# Patient Record
Sex: FEMALE | Race: WHITE | Employment: OTHER | URBAN - METROPOLITAN AREA
[De-identification: names, ages, dates, MRNs, and addresses within clinical notes are randomized per-mention and may not be internally consistent; named-entity substitution may affect disease eponyms.]

---

## 2017-04-08 LAB
CHOLESTEROL, TOTAL: 192 MG/DL (ref 0–199)
HDLC SERPL-MCNC: 55 MG/DL (ref 40–60)
HEPATITIS C ANTIBODY INTERPRETATION: NORMAL
LDL CHOLESTEROL CALCULATED: 116 MG/DL
TRIGL SERPL-MCNC: 105 MG/DL (ref 0–150)
VLDLC SERPL CALC-MCNC: 21 MG/DL

## 2017-04-17 ENCOUNTER — OFFICE VISIT (OUTPATIENT)
Dept: PRIMARY CARE CLINIC | Age: 72
End: 2017-04-17

## 2017-04-17 VITALS
HEIGHT: 60 IN | WEIGHT: 211 LBS | BODY MASS INDEX: 41.43 KG/M2 | SYSTOLIC BLOOD PRESSURE: 138 MMHG | DIASTOLIC BLOOD PRESSURE: 82 MMHG | HEART RATE: 100 BPM

## 2017-04-17 DIAGNOSIS — I10 BENIGN ESSENTIAL HTN: Primary | ICD-10-CM

## 2017-04-17 PROCEDURE — 99214 OFFICE O/P EST MOD 30 MIN: CPT | Performed by: INTERNAL MEDICINE

## 2017-04-17 RX ORDER — PRAVASTATIN SODIUM 40 MG
40 TABLET ORAL DAILY
Qty: 90 TABLET | Refills: 3 | Status: SHIPPED | OUTPATIENT
Start: 2017-04-17 | End: 2018-07-08 | Stop reason: SDUPTHER

## 2017-04-17 RX ORDER — HYDROCHLOROTHIAZIDE 12.5 MG/1
12.5 CAPSULE, GELATIN COATED ORAL EVERY MORNING
Qty: 90 CAPSULE | Refills: 2 | Status: SHIPPED | OUTPATIENT
Start: 2017-04-17 | End: 2017-12-26

## 2017-04-17 ASSESSMENT — PATIENT HEALTH QUESTIONNAIRE - PHQ9
1. LITTLE INTEREST OR PLEASURE IN DOING THINGS: 0
SUM OF ALL RESPONSES TO PHQ9 QUESTIONS 1 & 2: 0
SUM OF ALL RESPONSES TO PHQ QUESTIONS 1-9: 0
2. FEELING DOWN, DEPRESSED OR HOPELESS: 0

## 2017-07-21 ENCOUNTER — TELEPHONE (OUTPATIENT)
Dept: PRIMARY CARE CLINIC | Age: 72
End: 2017-07-21

## 2017-12-11 ENCOUNTER — OFFICE VISIT (OUTPATIENT)
Dept: PRIMARY CARE CLINIC | Age: 72
End: 2017-12-11

## 2017-12-11 VITALS
HEART RATE: 80 BPM | SYSTOLIC BLOOD PRESSURE: 160 MMHG | WEIGHT: 217.6 LBS | DIASTOLIC BLOOD PRESSURE: 90 MMHG | BODY MASS INDEX: 42.85 KG/M2

## 2017-12-11 DIAGNOSIS — R00.2 HEART PALPITATIONS: ICD-10-CM

## 2017-12-11 DIAGNOSIS — I10 BENIGN ESSENTIAL HTN: Primary | ICD-10-CM

## 2017-12-11 DIAGNOSIS — E78.00 HYPERCHOLESTEROLEMIA: ICD-10-CM

## 2017-12-11 LAB
BASOPHILS ABSOLUTE: 0 K/UL (ref 0–0.2)
BASOPHILS RELATIVE PERCENT: 0.5 %
CREATININE URINE: 189.4 MG/DL (ref 28–259)
EOSINOPHILS ABSOLUTE: 0.1 K/UL (ref 0–0.6)
EOSINOPHILS RELATIVE PERCENT: 1 %
HCT VFR BLD CALC: 46.1 % (ref 36–48)
HEMOGLOBIN: 15.6 G/DL (ref 12–16)
LYMPHOCYTES ABSOLUTE: 2.6 K/UL (ref 1–5.1)
LYMPHOCYTES RELATIVE PERCENT: 28.2 %
MCH RBC QN AUTO: 30.2 PG (ref 26–34)
MCHC RBC AUTO-ENTMCNC: 33.7 G/DL (ref 31–36)
MCV RBC AUTO: 89.6 FL (ref 80–100)
MICROALBUMIN UR-MCNC: 3 MG/DL
MICROALBUMIN/CREAT UR-RTO: 15.8 MG/G (ref 0–30)
MONOCYTES ABSOLUTE: 0.7 K/UL (ref 0–1.3)
MONOCYTES RELATIVE PERCENT: 7.7 %
NEUTROPHILS ABSOLUTE: 5.8 K/UL (ref 1.7–7.7)
NEUTROPHILS RELATIVE PERCENT: 62.6 %
PDW BLD-RTO: 13.4 % (ref 12.4–15.4)
PLATELET # BLD: 329 K/UL (ref 135–450)
PMV BLD AUTO: 7.8 FL (ref 5–10.5)
RBC # BLD: 5.15 M/UL (ref 4–5.2)
WBC # BLD: 9.2 K/UL (ref 4–11)

## 2017-12-11 PROCEDURE — 93000 ELECTROCARDIOGRAM COMPLETE: CPT | Performed by: INTERNAL MEDICINE

## 2017-12-11 PROCEDURE — 36415 COLL VENOUS BLD VENIPUNCTURE: CPT | Performed by: INTERNAL MEDICINE

## 2017-12-11 PROCEDURE — 99214 OFFICE O/P EST MOD 30 MIN: CPT | Performed by: INTERNAL MEDICINE

## 2017-12-11 RX ORDER — METOPROLOL SUCCINATE 50 MG/1
50 TABLET, EXTENDED RELEASE ORAL DAILY
Qty: 30 TABLET | Refills: 3 | Status: SHIPPED | OUTPATIENT
Start: 2017-12-11 | End: 2018-05-07 | Stop reason: SDUPTHER

## 2017-12-11 NOTE — PROGRESS NOTES
Ryan Jansen is a 81 y.o. female presenting today with c/o    HYPERTENSION. Also here for f/u HTN. She indicates that she is feeling well and denies any symptoms referable to her elevated blood pressure. Specifically denies chest pain, palpitations, dyspnea, orthopnea, PND or peripheral edema. No anorexia, arthralgia, or leg cramps noted. Current medication regimen is as listed below. She denies any side effects of medication, and has been taking it regularly. Patient does not exercise regularly        Dyslipidemia: Patient presents for f/u of lipid disorder. Compliance with treatment thus far has been good. A repeat fasting lipid profile was not done. The patient does not have family history of premature CAD. The patient denies side effects from medication including nausea and myalgias. The patient exercises never. Patient is overweight.   Review of Systems - comprehensive review of systems negative except as noted in HPI    Allergies   Allergen Reactions    Bactrim Diarrhea    Prochlorperazine Edisylate Other (See Comments)     Dystonic reaction    Prochlorperazine Maleate     Sulfamethoxazole-Trimethoprim     Amoxicillin      Jittery, causes her to feel sedated or jittery       Past Medical History:   Diagnosis Date    Adenomatous colon polyp 2/2011-[place] 12/15/2014    BMI 39.0-39.9,adult     BMI 40.0-44.9, adult (HCC)     Elevated blood pressure     Eustachian tube dysfunction 3/21/2014    HTN (hypertension) 2/14/2014    Hypercholesterolemia     Low bone mass 2/28/2014    Osteoarthritis, hand 12/15/2014    S/P tympanostomy tube placement 3/21/2014    Stress fracture of ankle 6/22/2016    Wheezing        Past Surgical History:   Procedure Laterality Date    APPENDECTOMY      BLADDER SURGERY      polyps    HYSTERECTOMY, TOTAL ABDOMINAL         Family History   Problem Relation Age of Onset    Alzheimer's Disease Mother     Asthma Brother     Hypertension Sister        Social History Social History    Marital status:      Spouse name: N/A    Number of children: N/A    Years of education: N/A     Occupational History    [employer]      Social History Main Topics    Smoking status: Never Smoker    Smokeless tobacco: Never Used    Alcohol use 0.0 oz/week      Comment: occasionally    Drug use: No    Sexual activity: No      Comment: ; 0 children     Other Topics Concern    Not on file     Social History Narrative    No regular exercise         Current Outpatient Prescriptions on File Prior to Visit   Medication Sig Dispense Refill    hydrochlorothiazide (MICROZIDE) 12.5 MG capsule Take 1 capsule by mouth every morning 90 capsule 2    pravastatin (PRAVACHOL) 40 MG tablet Take 1 tablet by mouth daily 90 tablet 3    ibuprofen (ADVIL;MOTRIN) 800 MG tablet Take 1 tablet by mouth every 8 hours as needed for Pain 30 tablet 0    diclofenac sodium (VOLTAREN) 1 % GEL Apply 4 g topically 4 times daily as needed Apply 4 gram 4 times a day 500 g 2    PAZEO 0.7 % SOLN       fluticasone (FLONASE) 50 MCG/ACT nasal spray 2 sprays each nostril daily 1 Bottle 2    Cholecalciferol (VITAMIN D) 2000 UNITS CAPS capsule Take 1 capsule by mouth daily. No current facility-administered medications on file prior to visit.         Vitals:    12/11/17 1458   BP: (!) 146/88   Pulse: 80         Wt Readings from Last 3 Encounters:   12/11/17 217 lb 9.6 oz (98.7 kg)   04/17/17 211 lb (95.7 kg)   10/17/16 217 lb (98.4 kg)     BP Readings from Last 3 Encounters:   12/11/17 (!) 146/88   04/17/17 138/82   10/17/16 144/88         BP (!) 160/90   Pulse 80   Wt 217 lb 9.6 oz (98.7 kg)   BMI 42.85 kg/m²   General appearance: alert, appears stated age, cooperative and morbidly obese  Eyes: negative findings: lids and lashes normal and conjunctivae and sclerae normal  Throat: lips, mucosa, and tongue normal; teeth and gums normal  Lungs: clear to auscultation bilaterally  Heart: regular rate , but frequent palpitations auscultated . S1, S2 normal, no murmur, click, rub or gallop  Neurologic: Mental status: Alert, oriented, thought content appropriate  Cranial nerves: normal  Skin: Skin is warm and dry. [facility]: normal mood and affect. speech is normal and behavior is normal. Judgment, cognition and memory are normal.     not applicable    Assessment and Plan  1. Benign essential HTN  Not well controlled  Add metoprolol-will help tachycardia  Does not want to increase her hctz . Felt she had SE before  - Lipid Panel  - Hemoglobin A1C  - Comprehensive Metabolic Panel  - Microalbumin / Creatinine Urine Ratio      2. BMI 40.0-44.9, adult ([facility])  Recommended at least 2.5 hours weekly of moderate intensity exercise  Low cho, high pro, high fiber, frequent small meals    3.  Heart palpitations  Reassuring ecg in office  Add toprol  - EKG 12 Lead  - CBC Auto Differential  - MAGNESIUM  - CBC Auto Differential  - MAGNESIUM    4. Hypercholesterolemia  Discuss increasing statin next visit for moderate/high intensity

## 2017-12-12 ENCOUNTER — TELEPHONE (OUTPATIENT)
Dept: PRIMARY CARE CLINIC | Age: 72
End: 2017-12-12

## 2017-12-12 LAB
A/G RATIO: 1.5 (ref 1.1–2.2)
ALBUMIN SERPL-MCNC: 4.5 G/DL (ref 3.4–5)
ALP BLD-CCNC: 115 U/L (ref 40–129)
ALT SERPL-CCNC: 17 U/L (ref 10–40)
ANION GAP SERPL CALCULATED.3IONS-SCNC: 20 MMOL/L (ref 3–16)
AST SERPL-CCNC: 19 U/L (ref 15–37)
BILIRUB SERPL-MCNC: 0.3 MG/DL (ref 0–1)
BUN BLDV-MCNC: 17 MG/DL (ref 7–20)
CALCIUM SERPL-MCNC: 10 MG/DL (ref 8.3–10.6)
CHLORIDE BLD-SCNC: 99 MMOL/L (ref 99–110)
CHOLESTEROL, TOTAL: 212 MG/DL (ref 0–199)
CO2: 25 MMOL/L (ref 21–32)
CREAT SERPL-MCNC: 1 MG/DL (ref 0.6–1.2)
ESTIMATED AVERAGE GLUCOSE: 114 MG/DL
GFR AFRICAN AMERICAN: >60
GFR NON-AFRICAN AMERICAN: 55
GLOBULIN: 3.1 G/DL
GLUCOSE BLD-MCNC: 97 MG/DL (ref 70–99)
HBA1C MFR BLD: 5.6 %
HDLC SERPL-MCNC: 62 MG/DL (ref 40–60)
LDL CHOLESTEROL CALCULATED: 122 MG/DL
MAGNESIUM: 2.5 MG/DL (ref 1.8–2.4)
POTASSIUM SERPL-SCNC: 4.5 MMOL/L (ref 3.5–5.1)
SODIUM BLD-SCNC: 144 MMOL/L (ref 136–145)
TOTAL PROTEIN: 7.6 G/DL (ref 6.4–8.2)
TRIGL SERPL-MCNC: 138 MG/DL (ref 0–150)
VLDLC SERPL CALC-MCNC: 28 MG/DL

## 2017-12-26 ENCOUNTER — OFFICE VISIT (OUTPATIENT)
Dept: PRIMARY CARE CLINIC | Age: 72
End: 2017-12-26

## 2017-12-26 VITALS
HEART RATE: 136 BPM | DIASTOLIC BLOOD PRESSURE: 100 MMHG | SYSTOLIC BLOOD PRESSURE: 144 MMHG | OXYGEN SATURATION: 98 % | BODY MASS INDEX: 42.34 KG/M2 | WEIGHT: 215 LBS

## 2017-12-26 DIAGNOSIS — I10 BENIGN ESSENTIAL HTN: Primary | ICD-10-CM

## 2017-12-26 PROCEDURE — 99214 OFFICE O/P EST MOD 30 MIN: CPT | Performed by: INTERNAL MEDICINE

## 2017-12-26 RX ORDER — HYDROCHLOROTHIAZIDE 25 MG/1
TABLET ORAL
Qty: 30 TABLET | Refills: 3
Start: 2017-12-26 | End: 2018-03-19 | Stop reason: SDUPTHER

## 2017-12-26 NOTE — PROGRESS NOTES
Caden Greene is a 81 y.o. female presenting today with c/o    HYPERTENSION. Also here for f/u HTN. She indicates that she is feeling well and denies any symptoms referable to her elevated blood pressure. Specifically denies chest pain, palpitations, dyspnea, orthopnea, PND or peripheral edema. No anorexia, arthralgia, or leg cramps noted. Current medication regimen is as listed below. She denies any side effects of medication, and has been taking it regularly.  Patient does not exercise regularly  Stopped toprol XL due to fatigue 4 days ago  bp readings at home 120-140/70-90 while taking the toprol  Felt hctz makes her urinate too much, but increased to 25 mg            Review of Systems - comprehensive review of systems negative except as noted in HPI    Allergies   Allergen Reactions    Bactrim Diarrhea    Prochlorperazine Edisylate Other (See Comments)     Dystonic reaction    Prochlorperazine Maleate     Sulfamethoxazole-Trimethoprim     Amoxicillin      Jittery, causes her to feel sedated or jittery       Past Medical History:   Diagnosis Date    BMI 39.0-39.9,adult     BMI 40.0-44.9, adult (HCC)     Elevated blood pressure     Eustachian tube dysfunction 3/21/2014    HTN (hypertension) 2/14/2014    Hypercholesterolemia     Low bone mass 2/28/2014    Osteoarthritis, hand 12/15/2014    S/P tympanostomy tube placement 3/21/2014    Stress fracture of ankle 6/22/2016    Wheezing        Past Surgical History:   Procedure Laterality Date    APPENDECTOMY      BLADDER SURGERY      polyps    HYSTERECTOMY, TOTAL ABDOMINAL         Family History   Problem Relation Age of Onset    Alzheimer's Disease Mother     Asthma Brother     Hypertension Sister        Social History     Social History    Marital status:      Spouse name: N/A    Number of children: N/A    Years of education: N/A     Occupational History    [employer]      Social History Main Topics    Smoking status: Never Smoker    Smokeless tobacco: Never Used    Alcohol use 0.0 oz/week      Comment: occasionally    Drug use: No    Sexual activity: No      Comment: ; 0 children     Other Topics Concern    Not on file     Social History Narrative    No regular exercise         Current Outpatient Prescriptions on File Prior to Visit   Medication Sig Dispense Refill    metoprolol succinate (TOPROL XL) 50 MG extended release tablet Take 1 tablet by mouth daily For high blood pressure 30 tablet 3    hydrochlorothiazide (MICROZIDE) 12.5 MG capsule Take 1 capsule by mouth every morning 90 capsule 2    pravastatin (PRAVACHOL) 40 MG tablet Take 1 tablet by mouth daily 90 tablet 3    ibuprofen (ADVIL;MOTRIN) 800 MG tablet Take 1 tablet by mouth every 8 hours as needed for Pain 30 tablet 0    diclofenac sodium (VOLTAREN) 1 % GEL Apply 4 g topically 4 times daily as needed Apply 4 gram 4 times a day 500 g 2    PAZEO 0.7 % SOLN       fluticasone (FLONASE) 50 MCG/ACT nasal spray 2 sprays each nostril daily 1 Bottle 2    Cholecalciferol (VITAMIN D) 2000 UNITS CAPS capsule Take 1 capsule by mouth daily. No current facility-administered medications on file prior to visit.         Vitals:    12/26/17 1601   BP: (!) 144/100   Pulse: 136   SpO2: 98%         Wt Readings from Last 3 Encounters:   12/26/17 215 lb (97.5 kg)   12/11/17 217 lb 9.6 oz (98.7 kg)   04/17/17 211 lb (95.7 kg)     BP Readings from Last 3 Encounters:   12/26/17 (!) 144/100   12/11/17 (!) 160/90   04/17/17 138/82         BP (!) 144/100 (Site: Right Arm, Position: Sitting, Cuff Size: Large Adult)   Pulse 136   Wt 215 lb (97.5 kg)   SpO2 98%   BMI 42.34 kg/m²   General appearance: alert and appears stated age  Eyes: negative findings: lids and lashes normal and conjunctivae and sclerae normal  Lungs: clear to auscultation bilaterally  Heart: regular rate and rhythm, S1, S2 normal, no murmur, click, rub or gallop  Neurologic: Mental status: Alert,

## 2018-01-09 ENCOUNTER — OFFICE VISIT (OUTPATIENT)
Dept: PRIMARY CARE CLINIC | Age: 73
End: 2018-01-09

## 2018-01-09 VITALS
DIASTOLIC BLOOD PRESSURE: 80 MMHG | WEIGHT: 217 LBS | SYSTOLIC BLOOD PRESSURE: 138 MMHG | BODY MASS INDEX: 42.74 KG/M2 | HEART RATE: 60 BPM

## 2018-01-09 DIAGNOSIS — M94.0 COSTOCHONDRITIS: ICD-10-CM

## 2018-01-09 DIAGNOSIS — I10 BENIGN ESSENTIAL HTN: Primary | ICD-10-CM

## 2018-01-09 PROCEDURE — 99214 OFFICE O/P EST MOD 30 MIN: CPT | Performed by: INTERNAL MEDICINE

## 2018-02-19 ENCOUNTER — OFFICE VISIT (OUTPATIENT)
Dept: ORTHOPEDIC SURGERY | Age: 73
End: 2018-02-19

## 2018-02-19 VITALS
HEIGHT: 61 IN | WEIGHT: 215 LBS | HEART RATE: 92 BPM | SYSTOLIC BLOOD PRESSURE: 132 MMHG | DIASTOLIC BLOOD PRESSURE: 76 MMHG | BODY MASS INDEX: 40.59 KG/M2

## 2018-02-19 DIAGNOSIS — M25.552 LEFT HIP PAIN: ICD-10-CM

## 2018-02-19 DIAGNOSIS — S46.312A TRICEPS STRAIN, LEFT, INITIAL ENCOUNTER: ICD-10-CM

## 2018-02-19 DIAGNOSIS — M25.512 LEFT SHOULDER PAIN, UNSPECIFIED CHRONICITY: Primary | ICD-10-CM

## 2018-02-19 DIAGNOSIS — M70.62 TROCHANTERIC BURSITIS OF LEFT HIP: ICD-10-CM

## 2018-02-19 PROCEDURE — 99214 OFFICE O/P EST MOD 30 MIN: CPT | Performed by: ORTHOPAEDIC SURGERY

## 2018-02-19 NOTE — PROGRESS NOTES
Review of Systems   Cardiovascular:        HBP     Musculoskeletal: Positive for joint pain. Skin:        roscea     All other systems reviewed and are negative.

## 2018-02-19 NOTE — PROGRESS NOTES
Date:  2018    Name:  Cedric Chung  Address:  [address] [place] [address]    :  1945      Age:   81 y.o.    SSN:  [id number]      Medical Record Number:  [id number]    Reason for Visit:    Chief Complaint    Shoulder Pain (OPNP, left shoulder pain) and Hip Pain (left hip pain)      DOS:2018     HPI: Tyson Ingram is a 81 y.o. female here today for evaluation of her left shoulder and her left hip. Her shoulder pain began 2 years ago when she reached laterally to  her CAT and felt a sharp pain in posterior aspect of her shoulder. Better on its own. She kept reinjuring it through repeated movements. Most recently happened about 2 months ago and the pain has been persistent. Worse with activity. It gets worse with cold temperatures. Heat makes it feel better. It does not bother her at night. It doesn't bother her first thing in the morning. She can perform all of her activities of daily living. There is no radiating numbness or tingling. She did not remember noticing any bruising. She think she feels some possible fibrofatty nodules in the area. She has the multiple other places and have had some removed in the past.    She's been having left hip pain that is laterally based. She's also had this in the past.  It hurts her when she is trying to get in and out of her car. It hurts her with walking for long distances or sitting for any extended period time. It doesn't radiate. There is no pain in her groin. Pain Assessment  Location of Pain: Shoulder  Location Modifiers: Left  Severity of Pain: 3  Quality of Pain: Dull (sore, stinging)  Duration of Pain:  (months)  Aggravating Factors:  (cold air)  Relieving Factors: Heat  Result of Injury: No  Work-Related Injury: No  Are there other pain locations you wish to document?: No  ROS: Pertinent items are noted in HPI.         Past Medical History:   Diagnosis Date    BMI 39.0-39.9,adult     BMI 40.0-44.9, visit. Allergies   Allergen Reactions    Bactrim Diarrhea    Prochlorperazine Edisylate Other (See Comments)     Dystonic reaction    Prochlorperazine Maleate     Sulfamethoxazole-Trimethoprim     Amoxicillin      Jittery, causes her to feel sedated or jittery       Vital signs:  /76   Pulse 92   Ht 5' 1\" (1.549 m)   Wt 215 lb (97.5 kg)   BMI 40.62 kg/m²        Neuro: Alert & oriented x 3,  normal,  no focal deficits noted. Normal affect. Eyes: sclera clear  Ears: Normal external ear  Mouth:  No perioral lesions  Pulm: Respirations unlabored and regular  Pulse: Regular rate and rhythm   Skin: Warm, well perfused        Left shoulder exam    Inspection:  No gross deformities, periscapular musculature is symmetry without atrophy, no obvious winging. No trophic changes. No visible masses. Palpation: Focal tenderness over the proximal lateral aspect of the triceps and posterior deltoid region. No significant tenderness overlying the rotator cuff footprint, bicipital groove, AC joint, or periscapular region. No palpable masses. Active/Passive ROM: full in forward flexion, abduction, external rotation with the elbow at the side, and internal rotation    Strength: 5/5 strength testing of deltoid, supraspinatus, infraspinatus, teres minor, and subscapularis    Stability: negative sulcus sign, no evidence of instability    Neurovascular: Neurovascularly intact    Special Tests: Negative impingement test.  Negative Bastrop's and speed's.         Right comparison shoulder exam    Inspection:  No gross deformities, periscapular musculature is symmetry without atrophy, no obvious winging    Palpation: No significant tenderness overlying the rotator cuff footprint, bicipital groove, AC joint, or periscapular region    Active/Passive ROM: full in forward flexion, abduction, external rotation with the elbow at the side, and internal rotation    Strength: 5/5 strength testing of deltoid, supraspinatus,

## 2018-02-22 ENCOUNTER — EVALUATION (OUTPATIENT)
Dept: PHYSICAL THERAPY | Age: 73
End: 2018-02-22

## 2018-02-22 DIAGNOSIS — M25.552 LEFT HIP PAIN: Primary | ICD-10-CM

## 2018-02-22 PROCEDURE — 97161 PT EVAL LOW COMPLEX 20 MIN: CPT | Performed by: PHYSICAL THERAPIST

## 2018-02-22 PROCEDURE — 97110 THERAPEUTIC EXERCISES: CPT | Performed by: PHYSICAL THERAPIST

## 2018-02-22 NOTE — PLAN OF CARE
[facility]       Physical Therapy Certification    Dear Referring Practitioner: Marge Herrera MD,    We had the pleasure of evaluating the following patient for physical therapy services at [address] [place] [address]. A summary of our findings can be found in the initial assessment below. This includes our plan of care. If you have any questions or concerns regarding these findings, please do not hesitate to contact me at the office phone number checked above. Thank you for the referral.       Physician Signature:_______________________________Date:__________________  By signing above (or electronic signature), therapists plan is approved by physician    Patient: Griselda Aviles   : 1945   MRN: [id number]  Referring Physician: Referring Practitioner: aMrge Herrera MD      Evaluation Date: 2018      Medical Diagnosis Information:  Diagnosis: L trochanteric bursitis M70.62   Treatment Diagnosis: L hip pain M25.552                                         Insurance information: PT Insurance Information: [place]     Precautions/ Contra-indications:   Latex Allergy:  [x]NO      []YES  Preferred Language for Healthcare:   [x]English       []other:    SUBJECTIVE: Patient stated complaint:Pt reports chronic L hip pain that has worsened over past couple months. Most pain reported with sleeping on L side, walking, and getting in/out of car. She has not used any ice/heat or taken any medication. She reports some L lat knee pain as well. Pt also reports \"locking\" outside of L hip after sitting. Relevant Medical History:none reported  Functional Disability Index:PT G-Codes  Functional Assessment Tool Used: LEFS  Score: 15%  Functional Limitation: Mobility: Walking and moving around  Mobility: Walking and Moving Around Current Status ():  At least 1 percent but less than 20 percent impaired, limited or restricted  Mobility: Walking and Moving Around Goal Status (): At least 1 percent but less than 20 percent impaired, limited or restricted    Pain Scale: 3/10  Easing factors: rest  Provocative factors: walking, twisting, squatting     Type: []Constant   [x]Intermittent  []Radiating []Localized []other:     Numbness/Tingling:none reported    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[place]; seated desk job    Living Status/Prior Level of Function: Independent with ADLs and IADLs, lives alone; no stairs    OBJECTIVE:     ROM LEFT RIGHT   HIP Flex WFL WFL   HIP Abd     HIP Ext     HIP IR Decreased 50% to R    HIP ER     Knee ext     Knee Flex     Ankle PF     Ankle DF     Ankle In     Ankle Ev     Strength  LEFT RIGHT   HIP Flexors 4+/5 5/5   HIP Abductors 4+/5 4+/5   HIP Ext     Hip ER 4+/5 4+/5   Hip IR 4+/5 4+/5   Knee EXT (quad) 5/5 5/5   Knee Flex (HS) 4/5 5/5   Ankle DF     Ankle PF     Ankle Inv     Ankle EV          Circumference  Mid apex  7 cm prox             Reflexes/Sensation: not tested   []Dermatomes/Myotomes intact    [x]Reflexes equal and normal bilaterally   []Other:    Joint mobility:    [x]Normal    []Hypo   []Hyper    Palpation: TTP L greater trochanter and ITB    Functional Mobility/Transfers: independent    Posture: knee valgus noted B    Bandages/Dressings/Incisions: n/a    Gait: (include devices/WB status) wide based gait with mild L antalgic gait pattern    Orthopedic Special Tests: (+) Obers L                       [x] Patient history, allergies, meds reviewed. Medical chart reviewed. See intake form. Review Of Systems (ROS):  [x]Performed Review of systems (Integumentary, CardioPulmonary, Neurological) by intake and observation. Intake form has been scanned into medical record. Patient has been instructed to contact their primary care physician regarding ROS issues if not already being addressed at this time.       Co-morbidities/Complexities (which will affect course of rehabilitation):   []None           Arthritic conditions hypomobility   [x]Decreased LE functional ROM   [x]Decreased core/proximal hip strength and neuromuscular control   [x]Decreased LE functional strength   [x]Reduced balance/proprioceptive control   []other:      Functional Activity Limitations (from functional questionnaire and intake)   [x]Reduced ability to tolerate prolonged functional positions   []Reduced ability or difficulty with changes of positions or transfers between positions   []Reduced ability to maintain good posture and demonstrate good body mechanics with sitting, bending, and lifting   [x]Reduced ability to sleep   [x] Reduced ability or tolerance with driving and/or computer work   []Reduced ability to perform lifting, carrying tasks   [x]Reduced ability to squat   []Reduced ability to forward bend   [x]Reduced ability to ambulate prolonged functional periods/distances/surfaces   [x]Reduced ability to ascend/descend stairs   [x]Reduced ability to run, hop, cut or jump   []other:    Participation Restrictions   []Reduced participation in self care activities   [x]Reduced participation in home management activities   []Reduced participation in work activities   [x]Reduced participation in social activities. [x]Reduced participation in sport/recreation activities. Classification :    []Signs/symptoms consistent with post-surgical status including decreased ROM, strength and function.    []Signs/symptoms consistent with joint sprain/strain   []Signs/symptoms consistent with patella-femoral syndrome   []Signs/symptoms consistent with knee OA/hip OA   []Signs/symptoms consistent with internal derangement of knee/Hip   [x]Signs/symptoms consistent with functional hip weakness/NMR control      []Signs/symptoms consistent with tendinitis/tendinosis    []signs/symptoms consistent with pathology which may benefit from Dry needling      [x]other: sxs consistent with greater trochanteric bursitis and ITB syndrome     Prognosis/Rehab Potential:

## 2018-02-22 NOTE — FLOWSHEET NOTE
No    PLAN: See eval  [] Continue per plan of care [] Alter current plan (see comments)  [x] Plan of care initiated [] Hold pending MD visit [] Discharge    Electronically signed by: Troy Villaseñor, DPT [id number]

## 2018-02-27 ENCOUNTER — TREATMENT (OUTPATIENT)
Dept: PHYSICAL THERAPY | Age: 73
End: 2018-02-27

## 2018-02-27 DIAGNOSIS — M25.512 CHRONIC LEFT SHOULDER PAIN: Primary | ICD-10-CM

## 2018-02-27 DIAGNOSIS — G89.29 CHRONIC LEFT SHOULDER PAIN: Primary | ICD-10-CM

## 2018-02-27 PROCEDURE — 97110 THERAPEUTIC EXERCISES: CPT | Performed by: PHYSICAL THERAPIST

## 2018-02-27 PROCEDURE — 97161 PT EVAL LOW COMPLEX 20 MIN: CPT | Performed by: PHYSICAL THERAPIST

## 2018-02-27 PROCEDURE — 97140 MANUAL THERAPY 1/> REGIONS: CPT | Performed by: PHYSICAL THERAPIST

## 2018-02-27 NOTE — PLAN OF CARE
Index: PT G-Codes  Functional Assessment Tool Used: QuickDASH  Score: 20%  Functional Limitation: Carrying, moving and handling objects  Carrying, Moving and Handling Objects Current Status (): At least 20 percent but less than 40 percent impaired, limited or restricted  Carrying, Moving and Handling Objects Goal Status (): At least 1 percent but less than 20 percent impaired, limited or restricted    Pain Scale: 3/10  Easing factors: rest, heat  Provocative factors: cold, sleeping, opening car door, lifting     Type: []Constant   [x]Intermittent  [x]Radiating []Localized []other:     Numbness/Tingling: occasionally L upper arm    Occupation/School: office job at [employer] Level of Function: Independent with ADLs and IADLs, lives alone, recently moved to new apartment and new job; would like to return to walking on a regular basis    OBJECTIVE:     CERV ROM All WFL    Cervical Flexion     Cervical Extension     Cervical SB     Cervical rotation          AROM Left Right   Shoulder Flex 160 deg    Shoulder Abd 162 deg    Shoulder ER T2 T4   Shoulder IR T10 T8                  Strength  Left Right   Shoulder Flex 5/5    Shoulder Scap 4+/5    Shoulder ER 4+/5    Shoulder IR 4+/5    Elbow flex 5/5    Elbow ext 5/5      Reflexes/Sensation:    [x]Dermatomes/Myotomes intact    []Reflexes equal and normal bilaterally   []Other:    Joint mobility:    [x]Normal    []Hypo   []Hyper    Palpation: TTP post capsule, lats, triceps    Functional Mobility/Transfers: independent    Posture: mild rounded shoulders, protracted scapulae    Bandages/Dressings/Incisions: n/a    Gait: (include devices/WB status): WNL    Orthopedic Special Tests: Cervical: (-) spurlings, (-) distraction  L shoulder: (-) Neer, (-) Empty Can                       [x] Patient history, allergies, meds reviewed. Medical chart reviewed. See intake form.      Review Of Systems (ROS):  [x]Performed Review of

## 2018-02-27 NOTE — FLOWSHEET NOTE
[facility],  [address]  Phone: [phone]  Fax [phone]      Physical Therapy Daily Treatment Note  Date:  2018    Patient Name:  Jarocho León    :  1945  MRN: [id number]  Restrictions/Precautions:    Medical/Treatment Diagnosis Information:  · Diagnosis: S46.312D (ICD-10-CM) - Triceps strain, left  · Treatment Diagnosis: L shoulder pain L16.390  Insurance/Certification information:  PT Insurance Information: [employer]  Physician Information:  Referring Practitioner: Vicki Pereira MD  Plan of care signed (Y/N):     Date of Patient follow up with Physician:     G-Code (if applicable):      Date G-Code Applied:    PT G-Codes  Functional Assessment Tool Used: QuickDASH  Score: 20%  Functional Limitation: Carrying, moving and handling objects  Carrying, Moving and Handling Objects Current Status (): At least 20 percent but less than 40 percent impaired, limited or restricted  Carrying, Moving and Handling Objects Goal Status ():  At least 1 percent but less than 20 percent impaired, limited or restricted    Progress Note: [x]  Yes  []  No  Next due by: Visit #10      Latex Allergy:  [x]NO      []YES  Preferred Language for Healthcare:   [x]English       []other:    Visit # Insurance Allowable Requires auth   1 75    [x]no        []yes:     Pain level:  3/10     SUBJECTIVE:  See eval    OBJECTIVE: See eval  Observation:   Test measurements:      RESTRICTIONS/PRECAUTIONS: pt also being treated for L hip pain    Exercises/Interventions:   Therapeutic Ex Sets/rep comments   Seated scap squeeze x15    Cross body post capsule S 5x20\"    Supine cane flexion S 10x10\"    sidelying ER 2x10         TB Row Green 2x10    TB Ext Green 2x10                                                      Pt ed: POC; HEP; heat; activity modification; postural ed 5'         Manual Intervention     Inf and post GHJ mobs gr III;

## 2018-03-01 ENCOUNTER — TREATMENT (OUTPATIENT)
Dept: PHYSICAL THERAPY | Age: 73
End: 2018-03-01

## 2018-03-01 DIAGNOSIS — M25.552 LEFT HIP PAIN: Primary | ICD-10-CM

## 2018-03-01 PROCEDURE — 97140 MANUAL THERAPY 1/> REGIONS: CPT | Performed by: PHYSICAL THERAPIST

## 2018-03-01 PROCEDURE — 97110 THERAPEUTIC EXERCISES: CPT | Performed by: PHYSICAL THERAPIST

## 2018-03-01 PROCEDURE — 97112 NEUROMUSCULAR REEDUCATION: CPT | Performed by: PHYSICAL THERAPIST

## 2018-03-01 NOTE — FLOWSHEET NOTE
tissue extensibility and allowing for proper ROM for normal function with self care, mobility, lifting and ambulation. Modalities:  Pt declined    Charges:  Timed Code Treatment Minutes: 45   Total Treatment Minutes: 45     [] EVAL (LOW) 85292 (typically 20 minutes face-to-face)  [] EVAL (MOD) 70256 (typically 30 minutes face-to-face)  [] EVAL (HIGH) 68266 (typically 45 minutes face-to-face)  [] RE-EVAL     [x] QV(72148) x  1   [] IONTO  [x] NMR (55817) x  1   [] VASO  [x] Manual (54777) x  1    [] Other:  [] TA x       [] Mech Traction (55140)  [] ES(attended) (37604)      [] ES (un) (07546):     GOALS:   Short Term Goals: To be achieved in: 2 weeks  1. Independent in HEP and progression per patient tolerance, in order to prevent re-injury. 2. Patient will have a decrease in pain to facilitate improvement in movement, function, and ADLs as indicated by Functional Deficits.     Long Term Goals: To be achieved in: 8 weeks  1. Disability index score of 7% or less for the LEFS to assist with reaching prior level of function. 2. Patient will demonstrate increased AROM to L hip IR = R to allow for proper joint functioning as indicated by patients Functional Deficits. 3. Patient will demonstrate an increase in Strength to to B hip/LE 5/5 to allow for proper functional mobility as indicated by patients Functional Deficits. 4. Patient will return to walking 2 blocks to after driving to work without increased symptoms or restriction. 5. Patient will sleep at least 6 hours without hip pain (patient specific functional goal)            Progression Towards Functional goals:  [x] Patient is progressing as expected towards functional goals listed. [] Progression is slowed due to complexities listed. [] Progression has been slowed due to co-morbidities. [] Plan just implemented, too soon to assess goals progression  [] Other:     ASSESSMENT:  Pt tolerated new standing TE without increased symptoms.   Hip fatigue noted following.     Treatment/Activity Tolerance:  [x] Patient tolerated treatment well [] Patient limited by fatique  [] Patient limited by pain  [] Patient limited by other medical complications  [] Other:     Prognosis: [x] Good [] Fair  [] Poor    Patient Requires Follow-up: [x] Yes  [] No    PLAN: See eval  [x] Continue per plan of care [] Alter current plan (see comments)  [x] Plan of care initiated [] Hold pending MD visit [] Discharge    Electronically signed by: Sandra Guzman, [facility], DPT [id number]

## 2018-03-13 ENCOUNTER — TREATMENT (OUTPATIENT)
Dept: PHYSICAL THERAPY | Age: 73
End: 2018-03-13

## 2018-03-13 DIAGNOSIS — G89.29 CHRONIC LEFT SHOULDER PAIN: Primary | ICD-10-CM

## 2018-03-13 DIAGNOSIS — M25.512 CHRONIC LEFT SHOULDER PAIN: Primary | ICD-10-CM

## 2018-03-13 PROCEDURE — 97140 MANUAL THERAPY 1/> REGIONS: CPT | Performed by: PHYSICAL THERAPIST

## 2018-03-13 PROCEDURE — 97110 THERAPEUTIC EXERCISES: CPT | Performed by: PHYSICAL THERAPIST

## 2018-03-13 NOTE — FLOWSHEET NOTE
[facility] [place]        Physical Therapy Daily Treatment Note  Date:  3/13/2018    Patient Name:  Eliseo Brothers    :  1945  MRN: [id number]  Restrictions/Precautions:    Medical/Treatment Diagnosis Information:  · Diagnosis: S46.312D (ICD-10-CM) - Triceps strain, left  · Treatment Diagnosis: L shoulder pain F63.825  Insurance/Certification information:  PT Insurance Information: [place]  Physician Information:  Referring Practitioner: Dave Mckeon MD  Plan of care signed (Y/N):     Date of Patient follow up with Physician:     G-Code (if applicable):      Date G-Code Applied:    PT G-Codes  Functional Assessment Tool Used: QuickDASH  Score: 20%  Functional Limitation: Carrying, moving and handling objects  Carrying, Moving and Handling Objects Current Status (): At least 20 percent but less than 40 percent impaired, limited or restricted  Carrying, Moving and Handling Objects Goal Status (): At least 1 percent but less than 20 percent impaired, limited or restricted    Progress Note: [x]  Yes  []  No  Next due by: Visit #10      Latex Allergy:  [x]NO      []YES  Preferred Language for Healthcare:   [x]English       []other:    Visit # Insurance Allowable Requires auth   2 75    [x]no        []yes:     Pain level:  3/10     SUBJECTIVE:  Pt states that the exercises seem to help but she hasn't been able to do them regularly. Her 13 y/o cat passed away last week.     OBJECTIVE:   Observation: tightness noted at end range flexion but improved PROM noted today  Test measurements:      RESTRICTIONS/PRECAUTIONS: pt also being treated for L hip pain    Exercises/Interventions:   Therapeutic Ex Sets/rep comments   Seated scap squeeze x15    Cross body post capsule S 5x20\"    Supine cane flexion S 10x10\"    sidelying ER 2x10    SL ABD 2x10         TB Row Green 3x10    TB Ext Green 3x10    No money 2x10x3\"    HAB RTB 2x10x3\"         [place] 3' modulating pain, promoting relaxation,  increasing ROM, reducing/eliminating soft tissue swelling/inflammation/restriction, improving soft tissue extensibility and allowing for proper ROM for normal function with self care, reaching, carrying, lifting, house/yardwork, driving/computer work    Modalities:Declined      Charges:  Timed Code Treatment Minutes: 55   Total Treatment Minutes: 55     [] EVAL (LOW) 58361 (typically 20 minutes face-to-face)  [] EVAL (MOD) 16143 (typically 30 minutes face-to-face)  [] EVAL (HIGH) 23993 (typically 45 minutes face-to-face)  [] RE-EVAL     [x] DZ(53945) x  2   [] IONTO  [] NMR (87547) x      [] VASO  [x] Manual (68429) x  2    [] Other:  [] TA x       [] Mech Traction (38751)  [] ES(attended) (28131)      [] ES (un) (96104):     GOALS:  Therapist goals for Patient:   Short Term Goals: To be achieved in: 2 weeks  1. Independent in HEP and progression per patient tolerance, in order to prevent re-injury. 2. Patient will have a decrease in pain to facilitate improvement in movement, function, and ADLs as indicated by Functional Deficits.     Long Term Goals: To be achieved in: 8 weeks  1. Disability index score of 10% or less for the [facility] to assist with reaching prior level of function. 2. Patient will demonstrate increased AROM to L shld flex/ deg, ER/IR =R to allow for proper joint functioning as indicated by patients Functional Deficits. 3. Patient will demonstrate an increase in Strength to L UE 5/5 to allow for proper functional mobility as indicated by patients Functional Deficits. 4. Patient will return to sleeping at least 6 hours without increased symptoms or restriction. 5. Patient will be able to lift her cat (20#) without increased shoulder pain. (patient specific functional goal)             Progression Towards Functional goals:  [x] Patient is progressing as expected towards functional goals listed.     [] Progression is slowed due to complexities listed. [] Progression has been slowed due to co-morbidities. [] Plan just implemented, too soon to assess goals progression  [] Other:     ASSESSMENT:  Improved PROM noted today. Pt tolerated new TE without c/o shoulder pain. Fatigue noted following.     Treatment/Activity Tolerance:  [x] Patient tolerated treatment well [] Patient limited by fatique  [] Patient limited by pain  [] Patient limited by other medical complications  [] Other:     Prognosis: [] Good [] Fair  [] Poor    Patient Requires Follow-up: [x] Yes  [] No    PLAN: See eval  [x] Continue per plan of care [] Alter current plan (see comments)  [] Plan of care initiated [] Hold pending MD visit [] Discharge    Electronically signed by: Troy Estevez, DPT [id number]

## 2018-03-15 ENCOUNTER — TREATMENT (OUTPATIENT)
Dept: PHYSICAL THERAPY | Age: 73
End: 2018-03-15

## 2018-03-15 DIAGNOSIS — M25.552 LEFT HIP PAIN: Primary | ICD-10-CM

## 2018-03-15 PROCEDURE — 97112 NEUROMUSCULAR REEDUCATION: CPT | Performed by: PHYSICAL THERAPIST

## 2018-03-15 PROCEDURE — 97110 THERAPEUTIC EXERCISES: CPT | Performed by: PHYSICAL THERAPIST

## 2018-03-15 PROCEDURE — 97140 MANUAL THERAPY 1/> REGIONS: CPT | Performed by: PHYSICAL THERAPIST

## 2018-03-15 NOTE — FLOWSHEET NOTE
relaxation,  increasing ROM, reducing/eliminating soft tissue swelling/inflammation/restriction, improving soft tissue extensibility and allowing for proper ROM for normal function with self care, mobility, lifting and ambulation. Modalities:  Pt declined    Charges:  Timed Code Treatment Minutes: 48   Total Treatment Minutes: 48     [] EVAL (LOW) 33012 (typically 20 minutes face-to-face)  [] EVAL (MOD) 39609 (typically 30 minutes face-to-face)  [] EVAL (HIGH) 23945 (typically 45 minutes face-to-face)  [] RE-EVAL     [x] CD(91169) x  1   [] IONTO  [x] NMR (68957) x  1   [] VASO  [x] Manual (05749) x  1    [] Other:  [] TA x       [] Mech Traction (07381)  [] ES(attended) (38607)      [] ES (un) (99322):     GOALS:   Short Term Goals: To be achieved in: 2 weeks  1. Independent in HEP and progression per patient tolerance, in order to prevent re-injury. 2. Patient will have a decrease in pain to facilitate improvement in movement, function, and ADLs as indicated by Functional Deficits.     Long Term Goals: To be achieved in: 8 weeks  1. Disability index score of 7% or less for the LEFS to assist with reaching prior level of function. 2. Patient will demonstrate increased AROM to L hip IR = R to allow for proper joint functioning as indicated by patients Functional Deficits. 3. Patient will demonstrate an increase in Strength to to B hip/LE 5/5 to allow for proper functional mobility as indicated by patients Functional Deficits. 4. Patient will return to walking 2 blocks to after driving to work without increased symptoms or restriction. 5. Patient will sleep at least 6 hours without hip pain (patient specific functional goal)            Progression Towards Functional goals:  [x] Patient is progressing as expected towards functional goals listed. [] Progression is slowed due to complexities listed. [] Progression has been slowed due to co-morbidities.   [] Plan just implemented, too soon to assess goals progression  [] Other:     ASSESSMENT: Improved stability noted with SLS today. Good posture with standing hip ABD; no lateral trunk lean noted.       Treatment/Activity Tolerance:  [x] Patient tolerated treatment well [] Patient limited by fatique  [] Patient limited by pain  [] Patient limited by other medical complications  [] Other:     Prognosis: [x] Good [] Fair  [] Poor    Patient Requires Follow-up: [x] Yes  [] No    PLAN: See eval  [x] Continue per plan of care [] Alter current plan (see comments)  [] Plan of care initiated [] Hold pending MD visit [] Discharge    Electronically signed by: Troy Browning, DPT [id number]

## 2018-03-19 RX ORDER — HYDROCHLOROTHIAZIDE 25 MG/1
TABLET ORAL
Qty: 30 TABLET | Refills: 3 | Status: SHIPPED | OUTPATIENT
Start: 2018-03-19 | End: 2018-05-07 | Stop reason: SDUPTHER

## 2018-03-19 RX ORDER — HYDROCHLOROTHIAZIDE 12.5 MG/1
12.5 CAPSULE, GELATIN COATED ORAL EVERY MORNING
Qty: 90 CAPSULE | Refills: 0 | OUTPATIENT
Start: 2018-03-19

## 2018-03-22 ENCOUNTER — TREATMENT (OUTPATIENT)
Dept: PHYSICAL THERAPY | Age: 73
End: 2018-03-22

## 2018-03-22 DIAGNOSIS — G89.29 CHRONIC LEFT SHOULDER PAIN: Primary | ICD-10-CM

## 2018-03-22 DIAGNOSIS — M25.512 CHRONIC LEFT SHOULDER PAIN: Primary | ICD-10-CM

## 2018-03-22 PROCEDURE — 97110 THERAPEUTIC EXERCISES: CPT | Performed by: PHYSICAL THERAPIST

## 2018-03-22 PROCEDURE — 97140 MANUAL THERAPY 1/> REGIONS: CPT | Performed by: PHYSICAL THERAPIST

## 2018-03-22 NOTE — FLOWSHEET NOTE
of cervical/CT, scapular GHJ and UE for the purpose of modulating pain, promoting relaxation,  increasing ROM, reducing/eliminating soft tissue swelling/inflammation/restriction, improving soft tissue extensibility and allowing for proper ROM for normal function with self care, reaching, carrying, lifting, house/yardwork, driving/computer work    Modalities:Declined      Charges:  Timed Code Treatment Minutes: 45   Total Treatment Minutes: 45     [] EVAL (LOW) 63554 (typically 20 minutes face-to-face)  [] EVAL (MOD) 73209 (typically 30 minutes face-to-face)  [] EVAL (HIGH) 13552 (typically 45 minutes face-to-face)  [] RE-EVAL     [x] ZE(81960) x  2   [] IONTO  [] NMR (44294) x      [] VASO  [x] Manual (49830) x  1    [] Other:  [] TA x       [] Mech Traction (03603)  [] ES(attended) (87575)      [] ES (un) (67535):     GOALS:  Therapist goals for Patient:   Short Term Goals: To be achieved in: 2 weeks  1. Independent in HEP and progression per patient tolerance, in order to prevent re-injury. 2. Patient will have a decrease in pain to facilitate improvement in movement, function, and ADLs as indicated by Functional Deficits.     Long Term Goals: To be achieved in: 8 weeks  1. Disability index score of 10% or less for the [facility] to assist with reaching prior level of function. 2. Patient will demonstrate increased AROM to L shld flex/ deg, ER/IR =R to allow for proper joint functioning as indicated by patients Functional Deficits. 3. Patient will demonstrate an increase in Strength to L UE 5/5 to allow for proper functional mobility as indicated by patients Functional Deficits. 4. Patient will return to sleeping at least 6 hours without increased symptoms or restriction.    5. Patient will be able to lift her cat (20#) without increased shoulder pain. (patient specific functional goal)             Progression Towards Functional goals:  [x] Patient is progressing as expected towards functional goals

## 2018-03-27 ENCOUNTER — TREATMENT (OUTPATIENT)
Dept: PHYSICAL THERAPY | Age: 73
End: 2018-03-27

## 2018-03-27 DIAGNOSIS — M25.512 CHRONIC LEFT SHOULDER PAIN: Primary | ICD-10-CM

## 2018-03-27 DIAGNOSIS — G89.29 CHRONIC LEFT SHOULDER PAIN: Primary | ICD-10-CM

## 2018-03-27 PROCEDURE — 97110 THERAPEUTIC EXERCISES: CPT | Performed by: PHYSICAL THERAPIST

## 2018-03-27 PROCEDURE — 97140 MANUAL THERAPY 1/> REGIONS: CPT | Performed by: PHYSICAL THERAPIST

## 2018-03-29 ENCOUNTER — TREATMENT (OUTPATIENT)
Dept: PHYSICAL THERAPY | Age: 73
End: 2018-03-29

## 2018-03-29 DIAGNOSIS — M25.512 CHRONIC LEFT SHOULDER PAIN: Primary | ICD-10-CM

## 2018-03-29 DIAGNOSIS — G89.29 CHRONIC LEFT SHOULDER PAIN: Primary | ICD-10-CM

## 2018-03-29 PROCEDURE — 97112 NEUROMUSCULAR REEDUCATION: CPT | Performed by: PHYSICAL THERAPIST

## 2018-03-29 PROCEDURE — 97140 MANUAL THERAPY 1/> REGIONS: CPT | Performed by: PHYSICAL THERAPIST

## 2018-03-29 PROCEDURE — 97110 THERAPEUTIC EXERCISES: CPT | Performed by: PHYSICAL THERAPIST

## 2018-03-29 NOTE — FLOWSHEET NOTE
[facility] [place]      Physical Therapy Daily Treatment Note  Date:  3/29/2018    Patient Name:  Jovanna Singletary    :  1945  MRN: [id number]  Restrictions/Precautions:    Medical/Treatment Diagnosis Information:  · Diagnosis: L trochanteric bursitis M70.62  · Treatment Diagnosis: L hip pain N85.734  Insurance/Certification information:  PT Insurance Information: [place]  Physician Information:  Referring Practitioner: Romulo Cox MD  Plan of care signed (Y/N):     Date of Patient follow up with Physician:     G-Code (if applicable):      Date G-Code Applied:    PT G-Codes  Functional Assessment Tool Used: LEFS  Score: 15%  Functional Limitation: Mobility: Walking and moving around  Mobility: Walking and Moving Around Current Status (): At least 1 percent but less than 20 percent impaired, limited or restricted  Mobility: Walking and Moving Around Goal Status (): At least 1 percent but less than 20 percent impaired, limited or restricted    Progress Note: [x]  Yes  []  No  Next due by: Visit #10       Latex Allergy:  [x]NO      []YES  Preferred Language for Healthcare:   [x]English       []other:    Visit # Insurance Allowable Requires auth   4 75    []no        []yes:       Pain level:  3/10     SUBJECTIVE:  Hip is feeling better, exercises seem to help. Noticed some discomfort in L glutes recently. Did my shoulder exercises too, which have helped.     OBJECTIVE:   Observation: decreased tightness along IT B noted  Test measurements:  SLS 10 sec R/L on floor; instability noted on airex    RESTRICTIONS/PRECAUTIONS:     Exercises/Interventions:     Therapeutic Ex Sets/sec Reps Notes   HL TA      ADD set     Bridge with ADD 2x10x5\"     BKFO      Modified Fig 4 S 3x30\" B    Tableside HS S     LTR x20 B     sidelying clamshells 2x10 B     Incline S 3x30\"     Reformer NV     Pt ed: POC, HEP, ice/heat 5'     Manual Intervention      Manual HS S, modified Bert S, foam Plan just implemented, too soon to assess goals progression  [] Other:     ASSESSMENT: Progressed SLS to airex today with instability noted. Fatigue noted in glute med throughout standing TE.       Treatment/Activity Tolerance:  [x] Patient tolerated treatment well [] Patient limited by fatique  [] Patient limited by pain  [] Patient limited by other medical complications  [] Other:     Prognosis: [x] Good [] Fair  [] Poor    Patient Requires Follow-up: [x] Yes  [] No    PLAN: See eval  [x] Continue per plan of care [] Alter current plan (see comments)  [] Plan of care initiated [] Hold pending MD visit [] Discharge    Electronically signed by: Troy Maria, DPT [id number]

## 2018-04-03 ENCOUNTER — TREATMENT (OUTPATIENT)
Dept: PHYSICAL THERAPY | Age: 73
End: 2018-04-03

## 2018-04-03 DIAGNOSIS — G89.29 CHRONIC LEFT SHOULDER PAIN: Primary | ICD-10-CM

## 2018-04-03 DIAGNOSIS — M25.512 CHRONIC LEFT SHOULDER PAIN: Primary | ICD-10-CM

## 2018-04-03 PROCEDURE — 97140 MANUAL THERAPY 1/> REGIONS: CPT | Performed by: PHYSICAL THERAPIST

## 2018-04-03 PROCEDURE — 97110 THERAPEUTIC EXERCISES: CPT | Performed by: PHYSICAL THERAPIST

## 2018-04-05 ENCOUNTER — TREATMENT (OUTPATIENT)
Dept: PHYSICAL THERAPY | Age: 73
End: 2018-04-05

## 2018-04-05 DIAGNOSIS — G89.29 CHRONIC LEFT SHOULDER PAIN: Primary | ICD-10-CM

## 2018-04-05 DIAGNOSIS — M25.512 CHRONIC LEFT SHOULDER PAIN: Primary | ICD-10-CM

## 2018-04-05 PROCEDURE — 97140 MANUAL THERAPY 1/> REGIONS: CPT | Performed by: PHYSICAL THERAPIST

## 2018-04-05 PROCEDURE — 97110 THERAPEUTIC EXERCISES: CPT | Performed by: PHYSICAL THERAPIST

## 2018-04-23 ENCOUNTER — TELEPHONE (OUTPATIENT)
Dept: PRIMARY CARE CLINIC | Age: 73
End: 2018-04-23

## 2018-04-23 DIAGNOSIS — E78.00 HYPERCHOLESTEROLEMIA: Primary | ICD-10-CM

## 2018-05-07 ENCOUNTER — OFFICE VISIT (OUTPATIENT)
Dept: PRIMARY CARE CLINIC | Age: 73
End: 2018-05-07

## 2018-05-07 VITALS
BODY MASS INDEX: 39.84 KG/M2 | HEART RATE: 87 BPM | TEMPERATURE: 98.6 F | DIASTOLIC BLOOD PRESSURE: 66 MMHG | SYSTOLIC BLOOD PRESSURE: 124 MMHG | WEIGHT: 211 LBS | RESPIRATION RATE: 16 BRPM | HEIGHT: 61 IN | OXYGEN SATURATION: 97 %

## 2018-05-07 DIAGNOSIS — Z00.00 ROUTINE PHYSICAL EXAMINATION: Primary | ICD-10-CM

## 2018-05-07 DIAGNOSIS — E28.39 ESTROGEN DEFICIENCY: ICD-10-CM

## 2018-05-07 PROCEDURE — 99397 PER PM REEVAL EST PAT 65+ YR: CPT | Performed by: INTERNAL MEDICINE

## 2018-05-07 RX ORDER — HYDROCHLOROTHIAZIDE 25 MG/1
TABLET ORAL
Qty: 90 TABLET | Refills: 3 | Status: SHIPPED | OUTPATIENT
Start: 2018-05-07

## 2018-05-07 RX ORDER — METOPROLOL SUCCINATE 50 MG/1
50 TABLET, EXTENDED RELEASE ORAL DAILY
Qty: 90 TABLET | Refills: 2 | Status: SHIPPED | OUTPATIENT
Start: 2018-05-07

## 2018-05-07 ASSESSMENT — PATIENT HEALTH QUESTIONNAIRE - PHQ9
SUM OF ALL RESPONSES TO PHQ QUESTIONS 1-9: 0
1. LITTLE INTEREST OR PLEASURE IN DOING THINGS: 0
SUM OF ALL RESPONSES TO PHQ9 QUESTIONS 1 & 2: 0
2. FEELING DOWN, DEPRESSED OR HOPELESS: 0

## 2018-05-25 NOTE — FLOWSHEET NOTE
[facility], [place]        Physical Therapy Daily Treatment Note  Date:  3/27/2018    Patient Name:  Cedric Chung    :  1945  MRN: [id number]  Restrictions/Precautions:    Medical/Treatment Diagnosis Information:  · Diagnosis: S46.312D (ICD-10-CM) - Triceps strain, left  · Treatment Diagnosis: L shoulder pain K93.094  Insurance/Certification information:  PT Insurance Information: [place]  Physician Information:  Referring Practitioner: Otilia Sandhoff, MD  Plan of care signed (Y/N):     Date of Patient follow up with Physician:     G-Code (if applicable):      Date G-Code Applied:    PT G-Codes  Functional Assessment Tool Used: QuickDASH  Score: 20%  Functional Limitation: Carrying, moving and handling objects  Carrying, Moving and Handling Objects Current Status (): At least 20 percent but less than 40 percent impaired, limited or restricted  Carrying, Moving and Handling Objects Goal Status (): At least 1 percent but less than 20 percent impaired, limited or restricted    Progress Note: [x]  Yes  []  No  Next due by: Visit #10      Latex Allergy:  [x]NO      []YES  Preferred Language for Healthcare:   [x]English       []other:    Visit # Insurance Allowable Requires auth   4 75    [x]no        []yes:     Pain level:  3/10     SUBJECTIVE:  Pt states that shoulder feels a little better but still sore. She's been cautious and afraid to do some of her HEP on her own.     OBJECTIVE:   Observation: reviewed HEP and reiterated importance of doing it daily as the TE does not aggravate her symptoms  Test measurements:      RESTRICTIONS/PRECAUTIONS: pt also being treated for L hip pain    Exercises/Interventions:   Therapeutic Ex Sets/rep comments   Seated scap squeeze HEP   Cross body post capsule S 5x20\"   Supine cane flexion S HEP   sidelying ER 3x10    SL ABD 2x10         TB Row Green 2x10 HEP   TB Ext Green 2x10 HEP   No money 2x10x3\"    HAB RTB 2x10x3\"    Standing Diarrhea

## 2018-06-15 ENCOUNTER — HOSPITAL ENCOUNTER (OUTPATIENT)
Dept: GENERAL RADIOLOGY | Age: 73
Discharge: HOME | End: 2018-06-15
Attending: INTERNAL MEDICINE | Admitting: INTERNAL MEDICINE

## 2018-06-15 ENCOUNTER — TELEPHONE (OUTPATIENT)
Dept: PRIMARY CARE CLINIC | Age: 73
End: 2018-06-15

## 2018-06-15 DIAGNOSIS — Z00.00 ENCOUNTER FOR GENERAL ADULT MEDICAL EXAMINATION WITHOUT ABNORMAL FINDINGS: ICD-10-CM

## 2018-06-15 DIAGNOSIS — E28.39 ESTROGEN DEFICIENCY: ICD-10-CM

## 2018-06-15 DIAGNOSIS — Z00.00 ROUTINE PHYSICAL EXAMINATION: ICD-10-CM

## 2018-07-09 RX ORDER — PRAVASTATIN SODIUM 40 MG
40 TABLET ORAL DAILY
Qty: 90 TABLET | Refills: 3 | Status: SHIPPED | OUTPATIENT
Start: 2018-07-09

## 2018-11-30 DIAGNOSIS — M19.072 OSTEOARTHRITIS OF LEFT ANKLE AND FOOT: ICD-10-CM

## 2018-11-30 DIAGNOSIS — M65.9 TENOSYNOVITIS OF LEFT FOOT: ICD-10-CM

## 2018-11-30 DIAGNOSIS — M67.372 TRANSIENT SYNOVITIS, LEFT ANKLE AND FOOT: Primary | ICD-10-CM

## 2019-04-22 ENCOUNTER — OFFICE VISIT (OUTPATIENT)
Dept: ORTHOPEDIC SURGERY | Age: 74
End: 2019-04-22
Payer: COMMERCIAL

## 2019-04-22 VITALS — WEIGHT: 215 LBS | BODY MASS INDEX: 42.21 KG/M2 | RESPIRATION RATE: 15 BRPM | HEIGHT: 60 IN

## 2019-04-22 DIAGNOSIS — M54.50 LUMBAR SPINE PAIN: Primary | ICD-10-CM

## 2019-04-22 DIAGNOSIS — M51.36 DDD (DEGENERATIVE DISC DISEASE), LUMBAR: ICD-10-CM

## 2019-04-22 PROCEDURE — 99213 OFFICE O/P EST LOW 20 MIN: CPT | Performed by: PHYSICIAN ASSISTANT

## 2019-04-24 ENCOUNTER — OFFICE VISIT (OUTPATIENT)
Dept: ORTHOPEDIC SURGERY | Age: 74
End: 2019-04-24
Payer: COMMERCIAL

## 2019-04-24 VITALS
BODY MASS INDEX: 42.2 KG/M2 | WEIGHT: 214.95 LBS | HEIGHT: 60 IN | SYSTOLIC BLOOD PRESSURE: 131 MMHG | DIASTOLIC BLOOD PRESSURE: 83 MMHG

## 2019-04-24 DIAGNOSIS — M51.36 DDD (DEGENERATIVE DISC DISEASE), LUMBAR: Primary | ICD-10-CM

## 2019-04-24 DIAGNOSIS — M51.26 HNP (HERNIATED NUCLEUS PULPOSUS), LUMBAR: ICD-10-CM

## 2019-04-24 DIAGNOSIS — M43.16 SPONDYLOLISTHESIS OF LUMBAR REGION: ICD-10-CM

## 2019-04-24 PROCEDURE — 99204 OFFICE O/P NEW MOD 45 MIN: CPT | Performed by: PHYSICIAN ASSISTANT

## 2019-04-24 RX ORDER — METHYLPREDNISOLONE 4 MG/1
TABLET ORAL
Qty: 1 KIT | Refills: 0 | Status: SHIPPED | OUTPATIENT
Start: 2019-04-24 | End: 2020-04-01 | Stop reason: ALTCHOICE

## 2019-04-24 RX ORDER — TIZANIDINE 4 MG/1
4 TABLET ORAL NIGHTLY
Qty: 30 TABLET | Refills: 0 | Status: SHIPPED | OUTPATIENT
Start: 2019-04-24

## 2019-04-24 NOTE — PROGRESS NOTES
New Patient: SPINE    Referring Provider:  No ref. provider found    CHIEF COMPLAINT:    Chief Complaint   Patient presents with    Lower Back Pain     NPOLIVIA       HISTORY OF PRESENT ILLNESS:      · The patient is being sent at the request of No ref. provider found in consultation as a new spine patient for low back pain and right leg pain. The patient is a 72 y.o. female whom reports pain since Sunday. She describes that the pain began when she was rolling to get out of bed. There was no other accident or injury involved. She describes her pain to be a dull ache with times of sharp stabbing, stinging pain on the right side of her low back. She describes the leg pain to progress down to her knee on the right leg. She describes that sitting, sleeping increase her pain. Other aggravating factors include bending and anything with increased activity. Her pain is persistent and constant since Sunday. Alleviating factors include resting. This does limit her behavior. She has a lot of pain and discomfort at night. · She describes that she has been using ibuprofen and Advil, which both do not help alleviate her pain. She has had success with physical therapy in the past in 2014, 2016, and 2018. She did see someone after hours on Monday, and she was told to use Advil to help relieve the pain. She was not given any further medications.     Pain Assessment  Location of Pain: Back(LSP)  Location Modifiers: Posterior  Severity of Pain: 8  Quality of Pain: Aching, Dull(Stinging)  Duration of Pain: Persistent  Frequency of Pain: Constant  Aggravating Factors: Walking, Standing, Bending, Other (Comment)(Increased activity)  Limiting Behavior: Yes  Relieving Factors: Rest  Result of Injury: No  Work-Related Injury: No  Are there other pain locations you wish to document?: No      Associated signs and symptoms:   Neurogenic bowel or bladder symptoms:  no   Perceived weakness:  no   Difficulty walking:  yes    Recent Disp: 90 tablet, Rfl: 3    diclofenac sodium (VOLTAREN) 1 % GEL, Apply 4 g topically 4 times daily as needed Apply 4 gram 4 times a day, Disp: 500 g, Rfl: 2    PAZEO 0.7 % SOLN, , Disp: , Rfl:     fluticasone (FLONASE) 50 MCG/ACT nasal spray, 2 sprays each nostril daily, Disp: 1 Bottle, Rfl: 2    Cholecalciferol (VITAMIN D) 2000 UNITS CAPS capsule, Take 1 capsule by mouth daily. , Disp: , Rfl:   Allergies:  Bactrim; Prochlorperazine edisylate; Prochlorperazine maleate; Sulfamethoxazole-trimethoprim; and Amoxicillin  Social History:    reports that she has never smoked. She has never used smokeless tobacco. She reports that she drinks alcohol. She reports that she does not use drugs. Family History:   Family History   Problem Relation Age of Onset   [facility] Alzheimer's Disease Mother     Asthma Brother     Hypertension Sister          REVIEW OF SYSTEMS: Full ROS reviewed & scanned from 4/24/2019           PHYSICAL EXAM:    Vitals: Blood pressure 131/83, height 5' (1.524 m), weight 214 lb 15.2 oz (97.5 kg). GENERAL EXAM:  · General Apparence: Patient is adequately groomed with no evidence of malnutrition. · Psychiatric: Orientation: The patient is oriented to time, place and person. The patient's mood and affect are appropriate   · Vascular: Examination reveals no swelling and palpation reveals no tenderness in upper or lower extremities. Good capillary refill. · The lymphatic examination of the neck, axillae and groin reveals all areas to be without enlargement or induration   Sensation is intact without deficit in the upper and lower extremities to light touch and pinprick  · Coordination of the upper and lower extremities are normal.    CERVICAL EXAMINATION:  · Inspection: Local inspection shows no step-off or bruising. Cervical alignment is normal. No instability is noted. · Palpation and Percussion: No evidence of tenderness at the midline. Paraspinal tenderness is not present.  There is no paraspinal spasm.  · Range of Motion:  pain-free ROM   · Strength: 5/5 bilateral upper extremities  · Special Tests:   Spurling's and Glover's are negative bilaterally. Antoine and Impingement tests are negative bilaterally. · Skin:There are no rashes, ulcerations or lesions. · Reflexes: Bilaterally triceps, biceps and brachioradialis are 2+. Clonus absent bilaterally at the feet. No pathological reflexes are noted. · Gait & station: normal, patient ambulates without assistance  · Additional Examinations:  · RIGHT UPPER EXTREMITY:  Inspection/examination of the right upper extremity does not show any tenderness, deformity or injury. Range of motion is normal and pain-free. There is no gross instability. There are no rashes, ulcerations or lesions. Strength and tone are normal. No atrophy or abnormal movements are noted. · LEFT UPPER EXTREMITY: Inspection/examination of the left upper extremity does not show any tenderness, deformity or injury. Range of motion is normal and pain-free. There is no gross instability. There are no rashes, ulcerations or lesions. Strength and tone are normal. No atrophy or abnormal movements are noted. LUMBAR/SACRAL EXAMINATION:  · Inspection: Local inspection shows no step-off or bruising. Lumbar alignment is normal. No instability is noted. · Palpation:   No evidence of tenderness at the midline. Lumbar paraspinal tenderness Mild L4/5 and L5/S1 tenderness right side greater than left. Bursal tenderness No tenderness bilaterally  There is no paraspinal spasm. · Range of Motion: limited by 50% in all planes due to pain with side bending to the right and flexion. · Strength:   Strength testing is 5/5 in all muscle groups tested. · Special Tests:   Straight leg raise positive on the right and negative on the left and crossed SLR negative. Alfredito's testing is negative bilaterally. FADIR's testing is negative bilaterally.   · Skin: There are no rashes, ulcerations or lesions. · Reflexes: Reflexes are symmetrically 2+ at the patellar and ankle tendons. Clonus absent bilaterally at the feet. · Gait & station: normal, patient ambulates without assistance  · Additional Examinations:  · RIGHT LOWER EXTREMITY: Inspection/examination of the right lower extremity does not show any tenderness, deformity or injury. Range of motion is unremarkable. There is no gross instability. There are no rashes, ulcerations or lesions. Strength and tone are normal. No atrophy or abnormal movements are noted. · LEFT LOWER EXTREMITY:  Inspection/examination of the left lower extremity does not show any tenderness, deformity or injury. Range of motion is unremarkable. There is no gross instability. There are no rashes, ulcerations or lesions. Strength and tone are normal. No atrophy or abnormal movements are noted. Diagnostic Testing:    Xrays:   I personally reviewed images of the lumbar spine from 4/22/19. Show severe DDD at L3-4 and L5-S1 with L4 spondylolisthesis on L5. MRI or CT:  None  EMG:  None  Results for orders placed or performed in visit on 12/11/17   Microalbumin / Creatinine Urine Ratio   Result Value Ref Range    Microalbumin, Random Urine 3.00 (H) <2.0 mg/dL    Creatinine, Ur 189.4 28.0 - 259.0 mg/dL    Microalbumin Creatinine Ratio 15.8 0.0 - 30.0 mg/g       Impression (Medical Decision Making):       1. DDD (degenerative disc disease), lumbar    2. Spondylolisthesis of lumbar region    3. HNP (herniated nucleus pulposus), lumbar        Plan (Medical Decision Making):    I discussed the diagnosis and the treatment options with Joi Pierson today. In Summary:  The various treatment options were outlined and discussed with Joi Pierson including:  Conservative care options: physical therapy, ice, medications, bracing, and activity modification. The indications for therapeutic injections. The indications for additional imaging/laboratory studies.   The indications for (possible future) interventions. After considering the various options discussed, Vladislav Malhotra elected to pursue a course of treatment that includes the followin. Medications: I will prescribe a medrol dose pack to help with the inflammatory component of pain. Risks, benefits and alternatives were discussed. Recommended to stop any NSAIDs to reduce risk of GI bleed during the course of the dose pack. I will also prescribe Zanaflex 4 mg to be taken at night as she is having trouble sleeping due to the pain. 2. PT:  I will start the patient on a trial of PT to work on a lumbar stabilization program to focus on core strengthening, core stabilizing, lumbar stretches, hamstring flexibility, modalities as indicated for 6-8 visits over the next 4-6 weeks. 3. Further studies:  No further imaging at this time. 4. Interventional:  At this point, no interventional options are recommended. 5. Healthy Lifestyle Measures:  Patient education material reviewing the following was distributed to Vladislav Malhotra  Anatomic drawings  Healthy lifestyle education  Osteoporosis prevention,   Back and neck pain educational information   Advanced imaging preparedness    Posture education   Proper lifting and carrying techniques,   Weight management  Quitting smoking and   Minor ways to treat back pain  For further information regarding the spine conditions and to review interventional treatments the patient was directed to [employer].    6.  Follow up:  4 weeks. The patient will attend 5-6 visits of therapy, and depending on her progression with pain, we will possibly proceed with an MRI of the lumbar spine. Vladislav Malhotra was instructed to call the office if her symptoms worsen or if new symptoms appear prior to the next scheduled visit.  She is specifically instructed to contact the office between now & her scheduled appointment if she has concerns related to her condition or if she needs

## 2019-04-28 NOTE — PROGRESS NOTES
tablet 1 tab po daily for htn 90 tablet 3    methylPREDNISolone (MEDROL, DEION,) 4 MG tablet Take by mouth. 1 kit 0    tiZANidine (ZANAFLEX) 4 MG tablet Take 1 tablet by mouth nightly 30 tablet 0    diclofenac sodium (VOLTAREN) 1 % GEL Apply 4 g topically 4 times daily as needed Apply 4 gram 4 times a day 500 g 2    PAZEO 0.7 % SOLN       fluticasone (FLONASE) 50 MCG/ACT nasal spray 2 sprays each nostril daily 1 Bottle 2    Cholecalciferol (VITAMIN D) 2000 UNITS CAPS capsule Take 1 capsule by mouth daily. No current facility-administered medications for this visit. Medical History:   Past Medical History:   Diagnosis Date    BMI 39.0-39.9,adult     BMI 40.0-44.9, adult ([facility])     Elevated blood pressure     Eustachian tube dysfunction 3/21/2014    HTN (hypertension) 2/14/2014    Hypercholesterolemia     Low bone mass 2/28/2014    Osteoarthritis, hand 12/15/2014    S/P tympanostomy tube placement 3/21/2014    Stress fracture of ankle 6/22/2016    Wheezing      Allergies:    Allergies   Allergen Reactions    Bactrim Diarrhea    Prochlorperazine Edisylate Other (See Comments)     Dystonic reaction    Prochlorperazine Maleate     Sulfamethoxazole-Trimethoprim     Amoxicillin      Jittery, causes her to feel sedated or jittery     Problem List:    Patient Active Problem List   Diagnosis    Hypercholesterolemia    Decreased bone mass-DEXA 2018    Allergic conjunctivitis and rhinitis    Eustachian tube dysfunction    S/P tympanostomy tube placement    BMI 40.0-44.9, adult ([facility] [address]    Osteoarthritis, hand    Adenomatous colon polyp 2/2011-[place]    Osteoarthritis of ankle and foot    Transient synovitis, left ankle and foot    Benign essential HTN    Tenosynovitis of left foot    Acute left ankle pain    Stress fracture of ankle       Review of Systems:  All systems were reviewed on 4/22/2019 and were negative except as indicated on the ROS form attached to this encounter (or located in the Media tab). Vital Signs:  Resp 15   Ht 5' (1.524 m)   Wt 215 lb (97.5 kg)   BMI 41.99 kg/m²     General Exam:  Constitutional: Patient is adequately groomed with no evidence of malnutrition  DTRs: Deep tendon reflexes are intact  Mental Status: The patient is oriented to time, place and person. The patient's mood and affect are appropriate. Neurological: The patient has good coordination. There is no weakness or sensory deficit. Right lumbar spine Examination:   Inspection: today's inspection lumbar spine reveals the skin be intact with no obvious deformity or spasm noted. Palpation: she's diffusely tender to palpation over the right low back into the right SI joint. Range of motion: she has a proximal 50% of the anticipated motion of lumbar spine with pain. Strength: there are no significant strength deficits noted upon testing    Special tests: negative femoral stretch and negative straight leg raise sign    Skin: There are no rashes, ulcerations or lesions    Distal neurovascular status grossly intact  Radiology:  X-rays obtained and reviewed in office:  Views: AP and lateral lumbar spine  Location(s): lumbar spine  Impression: there is significant degenerative changes from L3-S1. There are no acute fractures or spondylolisthesis noted. Assessment:  Degenerative disc disease lumbar spine    Impression:   Encounter Diagnoses   Name Primary?     Lumbar spine pain Yes    DDD (degenerative disc disease), lumbar        Office Procedures:  Orders Placed This Encounter   Procedures    XR LUMBAR SPINE (2-3 VIEWS)     Standing Status:   Future     Number of Occurrences:   1     Standing Expiration Date:   4/22/2020   [facility]     Referral Priority:   Routine     Referral Type:   Eval and Treat     Referral Reason:   Specialty Services Required     Requested Specialty:   Physical Therapy     Number of Visits Requested:   1       Treatment Plan:  Patient was referred to outpatient physical therapy for lumbar flexibility and core strengthening exercises. She is to continue with ibuprofen 800 mg twice a day with food and she'll follow-up with  in 2 weeks. Brian Gabriel PA-C    * Please note that some or all of this record was generated using voice recognition software. If there are any questions about the content of this document, please contact me as some errors in transcription may have occurred.

## 2019-04-30 ENCOUNTER — TELEPHONE (OUTPATIENT)
Dept: ORTHOPEDIC SURGERY | Age: 74
End: 2019-04-30

## 2019-04-30 NOTE — TELEPHONE ENCOUNTER
Patient has questions about medicine that was given to her for back pain. It has come back. . Is there something else she can be give. She still has muscle relaxer pills left. She has a PT appointment tomorrow at  for her evaluation. Pain is waking her up early in the morning.

## 2019-05-01 ENCOUNTER — TELEPHONE (OUTPATIENT)
Dept: ORTHOPEDIC SURGERY | Age: 74
End: 2019-05-01

## 2019-05-01 ENCOUNTER — HOSPITAL ENCOUNTER (OUTPATIENT)
Dept: PHYSICAL THERAPY | Age: 74
Setting detail: THERAPIES SERIES
Discharge: HOME | End: 2019-05-01
Payer: COMMERCIAL

## 2019-05-01 PROCEDURE — 97110 THERAPEUTIC EXERCISES: CPT | Performed by: PHYSICAL THERAPIST

## 2019-05-01 PROCEDURE — 97161 PT EVAL LOW COMPLEX 20 MIN: CPT | Performed by: PHYSICAL THERAPIST

## 2019-05-01 NOTE — TELEPHONE ENCOUNTER
Called patient 3 times today and could not get a hold of her and her mailbox has not been set up yet to leave a voice mail. I will try and follow up with her tomorrow to answer questions about her current pain.

## 2019-05-01 NOTE — FLOWSHEET NOTE
ambulation.  [] (98409) Provided verbal/tactile cueing for activities related to improving balance, coordination, kinesthetic sense, posture, motor skill, proprioception  to assist with core control in self care, mobility, lifting, and ambulation. Therapeutic Activities:    [] (81947 or 59012) Provided verbal/tactile cueing for activities related to improving balance, coordination, kinesthetic sense, posture, motor skill, proprioception and motor activation to allow for proper function  with self care and ADLs  [] (96690) Provided training and instruction to the patient for proper core and proximal hip recruitment and positioning with ambulation re-education     Home Exercise Program:    [x] (44770) Reviewed/Progressed HEP activities related to strengthening, flexibility, endurance, ROM of core, proximal hip and LE for functional self-care, mobility, lifting and ambulation   [] (07195) Reviewed/Progressed HEP activities related to improving balance, coordination, kinesthetic sense, posture, motor skill, proprioception of core, proximal hip and LE for self care, mobility, lifting, and ambulation      Manual Treatments: Traction / PA's (Gr I, II, III, IV) / Stretch- Hamstring, Piriformis, Hip Flexor, Groin / STM/ Sacral Decompression  [x] Provided manual therapy to mobilize soft tissue/joints for the purpose of modulating pain, promoting relaxation, increasing ROM, reducing/eliminating soft tissue swelling/inflammation/restriction, improving soft tissue extensibility and allowing for proper ROM for normal function. (38049).      Modalities:       Charges:  Timed Code Treatment Minutes: 20   Total Treatment Minutes: 45     [x] EVAL (LOW) 46946 (typically 20 minutes face-to-face)  [] EVAL (MOD) 22855 (typically 30 minutes face-to-face)  [] EVAL (HIGH) 64469 (typically 45 minutes face-to-face)  [] RE-EVAL     [x] HN(29238) x  1   [] IONTO  [] NMR (98646) x      [] VASO  [] Manual (42998) x       [] Other:  [] TA x

## 2019-05-01 NOTE — PLAN OF CARE
The [facility] and [facility]  [address], [place], [facility]  Phone: [phone]   Fax:     [phone]                                                       Physical Therapy Certification    Dear  Referring Practitioner: Isma Shen,    We had the pleasure of evaluating the following patient for physical therapy services at [address] [place] [address]. A summary of our findings can be found in the initial assessment below. This includes our plan of care. If you have any questions or concerns regarding these findings, please do not hesitate to contact me at the office phone number checked above. Thank you for the referral.       Physician Signature:_______________________________Date:__________________  By signing above (or electronic signature), therapists plan is approved by physician      Patient: Olena Canchola   : 1945   MRN: [id number]  Referring Physician: Referring Practitioner: Isma Shen      Evaluation Date: 2019      Medical Diagnosis Information:  Diagnosis: Low back pain, DDD   M54.5   Treatment Diagnosis: PT treatment diagnosis:  low back pain, weakness                                         Insurance information: PT Insurance Information: BCBS   75 visits/yr,  $25 copay     Precautions/ Contra-indications: osteopenia, HTN, OA  Latex Allergy:  [x]NO      []YES  Preferred Language for Healthcare:   [x]English       []other:    SUBJECTIVE: Patient stated complaint:  Had increased right sided low back pain on 19. Getting out of bed and noticed pain. Sits at computer desk all and does a lot of twisting. Started on steroid dose pack and had gradual decrease in symptoms. Over the past few nights has woken up in the middle of the night with increased back pain. States she sleeps in a prone position most of the night.   Has noticed some R lateral thigh pain when symptoms are at the Normal Abnormal N/A Comments   Prone knee bend       Prone hip IR       B Achilles reflex/Pheasant       PA/Spring       Prone Instability test       Sacral Spring/thrust       Femoral Nerve Tension Test                ROM LEFT RIGHT Comments   Lumbar Flex 75%     Lumbar Ext 50%     Side Bend 50% 50% Pain with RSB   Rotation 50% 50%                  ROM LEFT RIGHT Comments   Hip Flexion 95 95    Hip Abd      Hip ER 35 25    Hip IR 20 20    Hip Extension      Knee Ext      Knee Flex      Hamstring Flex -20 -35    Piriformis                    Strength LEFT RIGHT Comments   Multifidus      Transverse Ab 2 2    Hip Flexors 5 4    Hip Abductors 4+ 4    Hip Extensors                     Myotomes Normal Abnormal Comments   Hip flexion (L1-L2) x     Knee extension (L2-L4) x     Dorsiflexion (L4-L5) x     Great Toe Ext (L5) x     Ankle Eversion (S1-S2) x     Ankle PF(S1-S2) x         Dermatomes Normal Abnormal Comments   inguinal area (L1)  x     anterior mid-thigh (L2) x     distal ant thigh/med knee (L3) x     medial lower leg and foot (L4) x     lateral lower leg and foot (L5) x     posterior calf (S1) x     medial calcaneus (S2) x           Reflexes Normal Abnormal Comments   S1-2 Seated achilles x     S1-2 Prone knee bend      L3-4 Patellar tendon x     C5-6 Biceps      C6 Brachioradialis      C7-8 Triceps      Clonus      Babinski      Glover's        Joint mobility: lumbar spine n/a due to discomfort when pron3   []Normal    []Hypo   []Hyper    Palpation: R PSIS, piriformis    Functional Mobility/Transfers: independent    Posture: level pelvic landmarks    Gait: (include devices/WB status) WNL    Bandages/Dressings/Incisions: n/a                         [x] Patient history, allergies, meds reviewed. Medical chart reviewed. See intake form. Review Of Systems (ROS):  [x]Performed Review of systems (Integumentary, CardioPulmonary, Neurological) by intake and observation.  Intake form has been scanned into medical record. Patient has been instructed to contact their primary care physician regarding ROS issues if not already being addressed at this time. Co-morbidities/Complexities (which will affect course of rehabilitation):   []None           Arthritic conditions   []Rheumatoid arthritis (M05.9)  [x]Osteoarthritis (M19.91)   Cardiovascular conditions   [x]Hypertension (I10)  []Hyperlipidemia (E78.5)  []Angina pectoris (I20)  []Atherosclerosis (I70)   Musculoskeletal conditions   []Disc pathology   []Congenital spine pathologies   []Prior surgical intervention  []Osteoporosis (M81.8)  [x]Osteopenia (M85.8)   Endocrine conditions   []Hypothyroid (E03.9)  []Hyperthyroid Gastrointestinal conditions   []Constipation (X82.22)   Metabolic conditions   []Morbid obesity (E66.01)  []Diabetes type 1(E10.65) or 2 (E11.65)   []Neuropathy (G60.9)     Pulmonary conditions   []Asthma (J45)  []Coughing   []COPD (J44.9)   Psychological Disorders  []Anxiety (F41.9)  []Depression (F32.9)   []Other:   []Other:          Barriers to/and or personal factors that will affect rehab potential:              [x]Age  []Sex              []Motivation/Lack of Motivation                        [x]Co-Morbidities              []Cognitive Function, education/learning barriers              []Environmental, home barriers              []profession/work barriers  []past PT/medical experience  []other:  Justification: OA is degenerative, osteopenia will limit manual therapy    Falls Risk Assessment (30 days):   [x] Falls Risk assessed and no intervention required.   [] Falls Risk assessed and Patient requires intervention due to being higher risk   TUG score (>12s at risk):     [] Falls education provided, including       ASSESSMENT:   Functional Impairments:     []Noted lumbar/proximal hip hypomobility   []Noted lumbosacral and/or generalized hypermobility   [x]Decreased Lumbosacral/hip/LE functional ROM   [x]Decreased core/proximal hip strength and pathology which may benefit from Dry needling     []other:    Prognosis/Rehab Potential:      []Excellent   [x]Good    []Fair   []Poor    Tolerance of evaluation/treatment:    []Excellent   [x]Good    []Fair   []Poor    Physical Therapy Evaluation Complexity Justification  [x] A history of present problem with:  [] no personal factors and/or comorbidities that impact the plan of care;  [x]1-2 personal factors and/or comorbidities that impact the plan of care  []3 personal factors and/or comorbidities that impact the plan of care  [x] An examination of body systems using standardized tests and measures addressing any of the following: body structures and functions (impairments), activity limitations, and/or participation restrictions;:  [] a total of 1-2 or more elements   [x] a total of 3 or more elements   [] a total of 4 or more elements   [x] A clinical presentation with:  [x] stable and/or uncomplicated characteristics   [] evolving clinical presentation with changing characteristics  [] unstable and unpredictable characteristics;   [x] Clinical decision making of [x] low, [] moderate, [] high complexity using standardized patient assessment instrument and/or measurable assessment of functional outcome. [x] EVAL (LOW) 13383 (typically 20 minutes face-to-face)  [] EVAL (MOD) 03080 (typically 30 minutes face-to-face)  [] EVAL (HIGH) 96240 (typically 45 minutes face-to-face)  [] RE-EVAL       PLAN: Begin PT focusing on: proximal hip mobilizations, LB mobs, LB core activation, proximal hip activation, and HEP    Frequency/Duration:  1-2 days per week for 5 Weeks:  Interventions:  1. Therapeutic exercise including: strength training, ROM/flexibility, NMR and proprioception for the proximal upper extremity and deep neck flexors. 1 Therapeutic exercise including: strength training, ROM/flexibility, NMR and proprioception for the core, hips and bilateral lower extremities.    2. Manual therapy as indicated including Dry Needling/IASTM, STM, PROM, Gr I-IV mobilizations, spinal mobilization/manipulation. 3. Modalities as needed including: thermal agents, E-stim, US, iontophoresis as indicated. 4. Patient education on spine protection, activity modification, progression of HEP. HEP instruction: Can be found in media file. (see scanned forms)    GOALS:  Patient stated goal: decrease pain    The patient will demonstrate at least 20% but less than 40% impairment, limitation or restriction in:    - changing and maintaining body position    Therapist goals for Patient:Lumbar   Short Term Goals: To be achieved in: 2 weeks  1. Independent in HEP and progression per patient tolerance, in order to prevent re-injury. 2. Patient will have a decrease in pain to facilitate improvement in movement, function, and ADLs as indicated by Functional Deficits. Long Term Goals: To be achieved in: 5 weeks  1. Disability index score of 35% or less for the CHRISTELLE to assist with reaching prior level of function. 2. Patient will demonstrate increased AROM to WNL, good LS mobility, good hip ROM to allow for proper joint functioning as indicated by patients Functional Deficits. 3. Patient will demonstrate an increase in Strength to good proximal hip and core activation to allow for proper functional mobility as indicated by patients Functional Deficits. 4. Patient will return to sitting/driving for 30 minutes, functional activities without increased symptoms or restriction.      Electronically signed by:  Antwan Booth, [id number] [facility]

## 2019-05-02 ENCOUNTER — OFFICE VISIT (OUTPATIENT)
Dept: ORTHOPEDIC SURGERY | Age: 74
End: 2019-05-02
Payer: COMMERCIAL

## 2019-05-02 VITALS
BODY MASS INDEX: 42.2 KG/M2 | HEART RATE: 89 BPM | WEIGHT: 214.95 LBS | HEIGHT: 60 IN | SYSTOLIC BLOOD PRESSURE: 145 MMHG | DIASTOLIC BLOOD PRESSURE: 77 MMHG

## 2019-05-02 DIAGNOSIS — M51.26 HNP (HERNIATED NUCLEUS PULPOSUS), LUMBAR: ICD-10-CM

## 2019-05-02 DIAGNOSIS — M54.50 LUMBAR SPINE PAIN: ICD-10-CM

## 2019-05-02 DIAGNOSIS — M51.36 DDD (DEGENERATIVE DISC DISEASE), LUMBAR: Primary | ICD-10-CM

## 2019-05-02 DIAGNOSIS — M43.16 SPONDYLOLISTHESIS OF LUMBAR REGION: ICD-10-CM

## 2019-05-02 PROCEDURE — 99213 OFFICE O/P EST LOW 20 MIN: CPT | Performed by: PHYSICAL MEDICINE & REHABILITATION

## 2019-05-02 NOTE — PROGRESS NOTES
Follow up: Abhinav Dutta  1945  [id number]      CHIEF COMPLAINT:    Chief Complaint   Patient presents with    Back Problem     fua ck LSP. HISTORY OF PRESENT ILLNESS:  Ms. Corine Jc is a 72 y.o. female returns for a follow up visit for multiple medical problems. Her current presenting problems are   1. DDD (degenerative disc disease), lumbar    2. Spondylolisthesis of lumbar region    3. HNP (herniated nucleus pulposus), lumbar    4. Lumbar spine pain    . As per information/history obtained from the PADT(patient assessment and documentation tool) - She complains of pain in the lower back with radiation to the right leg She rates the pain 6/10 and describes it as dull, aching, stinging. Pain is made worse by: walking, standing, bending, waking up in the morning. She denies side effects from the current pain regimen. Patient reports that since the last follow up visit the physical functioning is unchanged, family/social relationships are unchanged, mood is unchanged and sleep patterns are unchanged, and that the overall functioning is unchanged. Patient denies neurological bowel or bladder. Patient was previously started on a Medrol Dosepak and [place] [place]. She reports that these entities helped her pain for the first few days. On April 27, 2019, the patient reports waking up early in the morning with increased back pain. According to her personal diary, this occurred everyday since that time. The patient reports feeling mentally slowed and jittery from taking the [employer] [place]. She requests something for her current symptoms and pain. On April 30th, 2019, her pain has been at its absolute worse. The patient has attended one physical therapy appointment since her last visit. Patient believes that her sleeping position may be affecting her pain. She feels that her pain is very fragile and can be triggered by anything. She denies any new injuries or triggers since her last visit.  The patient does not ambulate with any assistive devices in the office today. Associated signs and symptoms:   Neurogenic bowel or bladder symptoms:  no   Perceived weakness:  no   Difficulty walking:  yes              Past Medical History:   Past Medical History:   Diagnosis Date    BMI 39.0-39.9,adult     BMI 40.0-44.9, adult (HCC)     Elevated blood pressure     Eustachian tube dysfunction 3/21/2014    HTN (hypertension) 2/14/2014    Hypercholesterolemia     Low bone mass 2/28/2014    Osteoarthritis, hand 12/15/2014    S/P tympanostomy tube placement 3/21/2014    Stress fracture of ankle 6/22/2016    Wheezing       Past Surgical History:     Past Surgical History:   Procedure Laterality Date    APPENDECTOMY      BLADDER SURGERY      polyps    HYSTERECTOMY, TOTAL ABDOMINAL       Current Medications:     Current Outpatient Medications:     methylPREDNISolone (MEDROL, DEION,) 4 MG tablet, Take by mouth., Disp: 1 kit, Rfl: 0    tiZANidine (ZANAFLEX) 4 MG tablet, Take 1 tablet by mouth nightly, Disp: 30 tablet, Rfl: 0    pravastatin (PRAVACHOL) 40 MG tablet, TAKE 1 TABLET BY MOUTH DAILY, Disp: 90 tablet, Rfl: 3    metoprolol succinate (TOPROL XL) 50 MG extended release tablet, Take 1 tablet by mouth daily For high blood pressure, Disp: 90 tablet, Rfl: 2    hydrochlorothiazide (HYDRODIURIL) 25 MG tablet, 1 tab po daily for htn, Disp: 90 tablet, Rfl: 3    diclofenac sodium (VOLTAREN) 1 % GEL, Apply 4 g topically 4 times daily as needed Apply 4 gram 4 times a day, Disp: 500 g, Rfl: 2    PAZEO 0.7 % SOLN, , Disp: , Rfl:     fluticasone (FLONASE) 50 MCG/ACT nasal spray, 2 sprays each nostril daily, Disp: 1 Bottle, Rfl: 2    Cholecalciferol (VITAMIN D) 2000 UNITS CAPS capsule, Take 1 capsule by mouth daily. , Disp: , Rfl:   Allergies:  Bactrim; Prochlorperazine edisylate; Prochlorperazine maleate; Sulfamethoxazole-trimethoprim; and Amoxicillin  Social History:    reports that she has never smoked.  She has never used smokeless tobacco. She reports that she drinks alcohol. She reports that she does not use drugs. Family History:   Family History   Problem Relation Age of Onset   Rodriguez Gonzalez Alzheimer's Disease Mother     Asthma Brother     Hypertension Sister        REVIEW OF SYSTEMS:   CONSTITUTIONAL: Denies unexplained weight loss, fevers, chills or fatigue  NEUROLOGICAL: Denies unsteady gait or progressive weakness  MUSCULOSKELETAL: Denies joint swelling or redness  GI: Denies nausea, vomiting, diarrhea   : Denies bowel or bladder issues       PHYSICAL EXAM:    Vitals: Blood pressure (!) 145/77, pulse 89, height 5' (1.524 m), weight 214 lb 15.2 oz (97.5 kg). GENERAL EXAM:  · General Apparence: Patient is adequately groomed with no evidence of malnutrition. · Psychiatric: Orientation: The patient is oriented to time, place and person. The patient's mood and affect are appropriate   · Vascular: Examination reveals no swelling and palpation reveals no tenderness in upper or lower extremities. Good capillary refill. · The lymphatic examination of the neck, axillae and groin reveals all areas to be without enlargement or induration  · Sensation is intact without deficit in the upper and lower extremities to light touch and pinprick  · Coordination of the upper and lower extremities are normal.  · RIGHT UPPER EXTREMITY:  Inspection/examination of the right upper extremity does not show any tenderness, deformity or injury. Range of motion is unremarkable and pain-free. There is no gross instability. There are no rashes, ulcerations or lesions. Strength and tone are normal. No atrophy or abnormal movements are noted. · LEFT UPPER EXTREMITY: Inspection/examination of the left upper extremity does not show any tenderness, deformity or injury. Range of motion is unremarkable and pain-free. There is no gross instability. There are no rashes, ulcerations or lesions.  Strength and tone are normal. No atrophy or abnormal 3. HNP (herniated nucleus pulposus), lumbar    4. Lumbar spine pain        Plan:  Clinical Course: Above diagnoses are worsening    I discussed the diagnosis and the treatment options with Tish Dial today. In Summary:  The various treatment options were outlined and discussed with Tish Dial including:  Conservative care options: physical therapy, ice, medications, bracing, and activity modification. The indications for therapeutic injections. The indications for additional imaging/laboratory studies. The indications for (possible future) interventions. After considering the various options discussed, Tish Dial elected to pursue a course of treatment that includes the followin. Medications:  No further recommendations for new medications. 2. PT:  Encouraged to continue with HEP. Patient will continue with physical therapy for the next 3-4 weeks. She has had one visit so far and her treatment. 3. Further studies:  No further studies. 4. Interventional:  No further interventions at this time. 5. Follow up:  4 weeks. Patient will be placed off work from 19 through 19. Tish Dial was instructed to call the office if her symptoms worsen or if new symptoms appear prior to the next scheduled visit. She is specifically instructed to contact the office between now & her scheduled appointment if she has concerns related to her condition or if she needs assistance in scheduling the above tests. She is welcome to call for an appointment sooner if she has any additional concerns or questions. ISherren Eve, ATC, am scribing for and in the presence of Dr. Alberto Fajardo. 19 1:38 PM Mery Bridges ATC    I, Dr. Faina Skaggs, personally performed the services described in this documentation as scribed by EMMIE Liao in my presence and it is both accurate and complete. Reece Pedraza.  Izzy Ash MD, WERO, [facility]  Board Certified in Physical

## 2019-05-02 NOTE — LETTER
[facility]  [address]  [address] [place] [address]  Phone: [phone]  Fax: [phone]    Chauncey Villeda MD        May 2, 2019     Patient: Adin Velázquez   YOB: 1945   Date of Visit: 5/2/2019       To Whom It May Concern: It is my medical opinion that Deidre Jeong should remain out of work until May 15, 2019. If you have any questions or concerns, please don't hesitate to call.     Sincerely,          Chauncey Villeda MD

## 2019-05-02 NOTE — LETTER
[facility]  [address]  [address] [facility] [address]  Phone: [phone]  Fax: [phone]    Veronica Celeste MD        May 2, 2019     Patient: Vladislav Malhotra   YOB: 1945   Date of Visit: 5/2/2019       To Whom It May Concern: It is my medical opinion that Pete Rodas may return to work on May 15, 2019. If you have any questions or concerns, please don't hesitate to call.     Sincerely,            Veronica Celeste MD

## 2019-05-03 ENCOUNTER — HOSPITAL ENCOUNTER (OUTPATIENT)
Dept: PHYSICAL THERAPY | Age: 74
Setting detail: THERAPIES SERIES
Discharge: HOME | End: 2019-05-03
Payer: COMMERCIAL

## 2019-05-03 PROCEDURE — G0283 ELEC STIM OTHER THAN WOUND: HCPCS | Performed by: SPECIALIST/TECHNOLOGIST

## 2019-05-03 PROCEDURE — 97140 MANUAL THERAPY 1/> REGIONS: CPT | Performed by: SPECIALIST/TECHNOLOGIST

## 2019-05-03 PROCEDURE — 97110 THERAPEUTIC EXERCISES: CPT | Performed by: SPECIALIST/TECHNOLOGIST

## 2019-05-03 NOTE — FLOWSHEET NOTE
[address] [facility]    Physical Therapy Daily Treatment Note  Date:  5/3/2019    Patient Name:  Slade Scherer    :  1945  MRN: [id number]  Restrictions/Precautions:    Medical/Treatment Diagnosis Information:  · Diagnosis: Low back pain, DDD   M54.5  · Treatment Diagnosis: PT treatment diagnosis:  low back pain, weakness  Insurance/Certification information:  PT Insurance Information: BCBS   75 visits/yr,  $25 copay  Physician Information:  Referring Practitioner: Lucila Gimenez  Plan of care signed (Y/N):     Date of Patient follow up with Physician:     G-Code (if applicable):      Date G-Code Applied:         Progress Note: []  Yes  []  No  Next due by: Visit #10      Latex Allergy:  [x]NO      []YES  Preferred Language for Healthcare:   [x]English       []other:    Visit # Insurance Allowable   2 75       Auth Required   []  Yes    [] No    Visits Approved  Date Ranged-       Pain level:  6/10     SUBJECTIVE:   Pt. Reports no significant improvements. OBJECTIVE:   Observation:   Test measurements:      RESTRICTIONS/PRECAUTIONS: OA, osteopenia, HTN    Exercises/Interventions:       Script:  19  Exercise/Equipment Sets/Reps Notes Last Progression   Hand Knee [employer] Up      LTR 3x20'' B     Piriformis Cross Over/Figure 4 piriformis Stretch 3x20'' B     SKC 3x20'' B     DKC      Prone Quad Stretch      90/90 Hamstring Stretch 3x20'' B     Quadruped UE      Quadruped LE      Quadruped UE/LE combined (birddog)       TA / Multifidus / Abd Hollow 15x5''     TA March      Bridge 15x     SLR Flex      SLR Abd      Clamshells - supine Or.  Loop 20 x  New     Prone plank      Side plank      Squats      Standing Stretch (insert muscle)                Therapeutic Exercise and NMR EXR  [x] (38452) Provided verbal/tactile cueing for activities related to strengthening, flexibility, endurance, ROM  for improvements in proximal hip and core control with self care, mobility, lifting and ambulation.  [] (80058) Provided verbal/tactile cueing for activities related to improving balance, coordination, kinesthetic sense, posture, motor skill, proprioception  to assist with core control in self care, mobility, lifting, and ambulation. Therapeutic Activities:    [] (90800 or 47624) Provided verbal/tactile cueing for activities related to improving balance, coordination, kinesthetic sense, posture, motor skill, proprioception and motor activation to allow for proper function  with self care and ADLs  [] (41880) Provided training and instruction to the patient for proper core and proximal hip recruitment and positioning with ambulation re-education     Home Exercise Program:    [x] (08798) Reviewed/Progressed HEP activities related to strengthening, flexibility, endurance, ROM of core, proximal hip and LE for functional self-care, mobility, lifting and ambulation   [] (42988) Reviewed/Progressed HEP activities related to improving balance, coordination, kinesthetic sense, posture, motor skill, proprioception of core, proximal hip and LE for self care, mobility, lifting, and ambulation      Manual Treatments: Traction / PA's (Gr I, II, III, IV) / Stretch- Hamstring, Piriformis, Hip Flexor, Groin / STM/ Sacral Decompression  [x] Provided manual therapy to mobilize soft tissue/joints for the purpose of modulating pain, promoting relaxation, increasing ROM, reducing/eliminating soft tissue swelling/inflammation/restriction, improving soft tissue extensibility and allowing for proper ROM for normal function. (38969).      Modalities:   PM + P 15'    Charges:  Timed Code Treatment Minutes: 30   Total Treatment Minutes: 45     [] EVAL (LOW) 30889 (typically 20 minutes face-to-face)  [] EVAL (MOD) 99158 (typically 30 minutes face-to-face)  [] EVAL (HIGH) 97074 (typically 45 minutes face-to-face)  [] RE-EVAL     [x] QZ(25574) x  1   [] IONTO  [] NMR (65401) x [] VASO  [x] Manual (35454) x  1    [] Other:  [] TA x       [] Mech Traction (24323)  [] ES(attended) (83416)      [x] ES (un) (67518):     Goals: Therapist goals for Patient:Lumbar   Short Term Goals: To be achieved in: 2 weeks  1. Independent in HEP and progression per patient tolerance, in order to prevent re-injury. 2. Patient will have a decrease in pain to facilitate improvement in movement, function, and ADLs as indicated by Functional Deficits. Long Term Goals: To be achieved in: 5 weeks  1. Disability index score of 35% or less for the CHRISTELLE to assist with reaching prior level of function. 2. Patient will demonstrate increased AROM to WNL, good LS mobility, good hip ROM to allow for proper joint functioning as indicated by patients Functional Deficits. 3. Patient will demonstrate an increase in Strength to good proximal hip and core activation to allow for proper functional mobility as indicated by patients Functional Deficits. 4. Patient will return to sitting/driving for 30 minutes, functional activities without increased symptoms or restriction. Progression Towards Functional goals:  [] Patient is progressing as expected towards functional goals listed. [] Progression is slowed due to complexities listed. [] Progression has been slowed due to co-morbidities. [x] Plan just implemented, too soon to assess goals progression  [] Other:     ASSESSMENT:  Pt. requires multiple cues with exercises for proper techniques.       Treatment/Activity Tolerance:  [x] Patient tolerated treatment well [] Patient limited by fatique  [] Patient limited by pain  [] Patient limited by other medical complications  [] Other:     Prognosis: [x] Good [] Fair  [] Poor    Patient Requires Follow-up: [x] Yes  [] No    PLAN FOR NEXT SESSION:     PLAN: See eval  [x] Continue per plan of care [] Alter current plan (see comments)  [] Plan of care initiated [] Hold pending MD visit [] Discharge    *If patient does not return for further follow ups after this date. Please consider this as the patients discharge from physical therapy.      Electronically signed by: Alida Johnson, [address], Shea Moss [address]

## 2019-05-08 ENCOUNTER — HOSPITAL ENCOUNTER (OUTPATIENT)
Dept: PHYSICAL THERAPY | Age: 74
Setting detail: THERAPIES SERIES
Discharge: HOME | End: 2019-05-08
Payer: COMMERCIAL

## 2019-05-08 PROCEDURE — G0283 ELEC STIM OTHER THAN WOUND: HCPCS | Performed by: SPECIALIST/TECHNOLOGIST

## 2019-05-08 PROCEDURE — 97140 MANUAL THERAPY 1/> REGIONS: CPT | Performed by: SPECIALIST/TECHNOLOGIST

## 2019-05-08 PROCEDURE — 97110 THERAPEUTIC EXERCISES: CPT | Performed by: SPECIALIST/TECHNOLOGIST

## 2019-05-08 NOTE — FLOWSHEET NOTE
The [address] [place] [address] [facility]    Physical Therapy Daily Treatment Note  Date:  2019    Patient Name:  Carmen Andrade  \"Misa\"  :  1945  MRN: [id number]  Restrictions/Precautions:    Medical/Treatment Diagnosis Information:  · Diagnosis: Low back pain, DDD   M54.5  · Treatment Diagnosis: PT treatment diagnosis:  low back pain, weakness  Insurance/Certification information:  PT Insurance Information: BCBS   75 visits/yr,  $25 copay  Physician Information:  Referring Practitioner: Darlin Jarquin  Plan of care signed (Y/N):     Date of Patient follow up with Physician:     G-Code (if applicable):      Date G-Code Applied:         Progress Note: []  Yes  []  No  Next due by: Visit #10      Latex Allergy:  [x]NO      []YES  Preferred Language for Healthcare:   [x]English       []other:    Visit # Insurance Allowable   3 75       Auth Required   []  Yes    [] No    Visits Approved  Date Ranged-       Pain level:  6/10     SUBJECTIVE:   Pt. Reports her back feels the worst in the morning. OBJECTIVE:   Observation:   Test measurements:      RESTRICTIONS/PRECAUTIONS: OA, osteopenia, HTN    Exercises/Interventions:       Script:  19  Exercise/Equipment Sets/Reps Notes Last Progression   Hand Knee Rock      Prone Press Up      LTR 20 x 3\" Changed     Piriformis Cross Over/Figure 4 piriformis Stretch 3x30'' B with towel     SKC 2x30'' B with towel      DKC      Prone Quad Stretch      90/90 Hamstring Stretch 2x30'' B with towel     Quadruped UE      Quadruped LE      Quadruped UE/LE combined (birddog)       TA / Multifidus / Abd Hollow 15x5''     TA March      Bridge 2 x 10 Increase reps    SLR Flex      Prone glut set 15 x 5\" New     Clamshells - supine green.  Loop 2 x 15  Increase reps     Standing hip abd / ext X 15 ea B New           Squats      Standing Stretch (insert muscle)      manuals 10' New         Therapeutic Exercise and NMR EXR  [x] (42826) Provided verbal/tactile cueing for activities related to strengthening, flexibility, endurance, ROM  for improvements in proximal hip and core control with self care, mobility, lifting and ambulation.  [] (78846) Provided verbal/tactile cueing for activities related to improving balance, coordination, kinesthetic sense, posture, motor skill, proprioception  to assist with core control in self care, mobility, lifting, and ambulation. Therapeutic Activities:    [] (17557 or 35373) Provided verbal/tactile cueing for activities related to improving balance, coordination, kinesthetic sense, posture, motor skill, proprioception and motor activation to allow for proper function  with self care and ADLs  [] (03309) Provided training and instruction to the patient for proper core and proximal hip recruitment and positioning with ambulation re-education     Home Exercise Program:    [x] (90053) Reviewed/Progressed HEP activities related to strengthening, flexibility, endurance, ROM of core, proximal hip and LE for functional self-care, mobility, lifting and ambulation   [] (40440) Reviewed/Progressed HEP activities related to improving balance, coordination, kinesthetic sense, posture, motor skill, proprioception of core, proximal hip and LE for self care, mobility, lifting, and ambulation      Manual Treatments: Traction / PA's (Gr I, II, III, IV) / Stretch- Hamstring, Piriformis, Hip Flexor, Groin / STM - right glut med / Sacral Decompression  [x] Provided manual therapy to mobilize soft tissue/joints for the purpose of modulating pain, promoting relaxation, increasing ROM, reducing/eliminating soft tissue swelling/inflammation/restriction, improving soft tissue extensibility and allowing for proper ROM for normal function. (48880). Modalities:   PM + MHP 15'    Charges:  Timed Code Treatment Minutes: 40   Total Treatment Minutes: 54   Pt.  Was in a hour time slot  [] MARGUERITE (LOW) 41263 (typically 20 minutes face-to-face)  [] EVAL (MOD) 73453 (typically 30 minutes face-to-face)  [] EVAL (HIGH) 76438 (typically 45 minutes face-to-face)  [] RE-EVAL     [x] NA(19442) x  2   [] IONTO  [] NMR (48538) x      [] VASO  [x] Manual (02489) x  1    [] Other:  [] TA x       [] Mech Traction (28712)  [] ES(attended) (67438)      [x] ES (un) (58889):     Goals: Therapist goals for Patient:Lumbar   Short Term Goals: To be achieved in: 2 weeks  1. Independent in HEP and progression per patient tolerance, in order to prevent re-injury. 2. Patient will have a decrease in pain to facilitate improvement in movement, function, and ADLs as indicated by Functional Deficits. Long Term Goals: To be achieved in: 5 weeks  1. Disability index score of 35% or less for the CHRISTELLE to assist with reaching prior level of function. 2. Patient will demonstrate increased AROM to WNL, good LS mobility, good hip ROM to allow for proper joint functioning as indicated by patients Functional Deficits. 3. Patient will demonstrate an increase in Strength to good proximal hip and core activation to allow for proper functional mobility as indicated by patients Functional Deficits. 4. Patient will return to sitting/driving for 30 minutes, functional activities without increased symptoms or restriction. Progression Towards Functional goals:  [] Patient is progressing as expected towards functional goals listed. [] Progression is slowed due to complexities listed. [] Progression has been slowed due to co-morbidities. [x] Plan just implemented, too soon to assess goals progression  [] Other:     ASSESSMENT:  Pt. requires multiple cues with exercises for proper techniques.       Treatment/Activity Tolerance:  [x] Patient tolerated treatment well [] Patient limited by fatique  [] Patient limited by pain  [] Patient limited by other medical complications  [] Other:     Prognosis: [x] Good [] Fair  [] Poor    Patient Requires Follow-up: [x] Yes  [] No    PLAN FOR NEXT SESSION:     PLAN: See eval  [x] Continue per plan of care [] Alter current plan (see comments)  [] Plan of care initiated [] Hold pending MD visit [] Discharge    *If patient does not return for further follow ups after this date. Please consider this as the patients discharge from physical therapy.      Electronically signed by: Scott Diaz, [address], Farooq Andrade, [address]

## 2019-05-09 NOTE — PROGRESS NOTES
I have reviewed and agree to the content of the note created by the Physical Therapist Assistant.     Electronically signed by Tino Amin PT

## 2019-05-10 ENCOUNTER — HOSPITAL ENCOUNTER (OUTPATIENT)
Dept: PHYSICAL THERAPY | Age: 74
Setting detail: THERAPIES SERIES
Discharge: HOME | End: 2019-05-10
Payer: COMMERCIAL

## 2019-05-10 ENCOUNTER — TELEPHONE (OUTPATIENT)
Dept: ORTHOPEDIC SURGERY | Age: 74
End: 2019-05-10

## 2019-05-10 PROCEDURE — 97110 THERAPEUTIC EXERCISES: CPT | Performed by: SPECIALIST/TECHNOLOGIST

## 2019-05-10 PROCEDURE — G0283 ELEC STIM OTHER THAN WOUND: HCPCS | Performed by: SPECIALIST/TECHNOLOGIST

## 2019-05-10 PROCEDURE — 97140 MANUAL THERAPY 1/> REGIONS: CPT | Performed by: SPECIALIST/TECHNOLOGIST

## 2019-05-10 NOTE — FLOWSHEET NOTE
The [address] [place] [address] [facility]    Physical Therapy Daily Treatment Note  Date:  5/10/2019    Patient Name:  David Jarquin  \"Misa\"  :  1945  MRN: [id number]  Restrictions/Precautions:    Medical/Treatment Diagnosis Information:  · Diagnosis: Low back pain, DDD   M54.5  · Treatment Diagnosis: PT treatment diagnosis:  low back pain, weakness  Insurance/Certification information:  PT Insurance Information: BCBS   75 visits/yr,  $25 copay  Physician Information:  Referring Practitioner: Geovanna Mullins  Plan of care signed (Y/N):     Date of Patient follow up with Physician:     G-Code (if applicable):      Date G-Code Applied:         Progress Note: []  Yes  []  No  Next due by: Visit #10      Latex Allergy:  [x]NO      []YES  Preferred Language for Healthcare:   [x]English       []other:    Visit # Insurance Allowable   4 75       Auth Required   []  Yes    [] No    Visits Approved  Date Ranged-       Pain level:  4/10     SUBJECTIVE:   Pt. Reports her back has less pain in the morning since her last PT visit. OBJECTIVE:   Observation:   Test measurements:      RESTRICTIONS/PRECAUTIONS: OA, osteopenia, HTN    Exercises/Interventions:       Script:  19  Exercise/Equipment Sets/Reps Notes Last Progression   Hand Knee Rock      Prone Press Up      LTR 20 x 3\"     Piriformis Cross Over/Figure 4 piriformis Stretch 3x30'' B with strap     SKC 2x30'' B with strap      DKC      Prone Quad Stretch      90/90 Hamstring Stretch 2x30'' B with strap     Quadruped UE      Quadruped LE      Quadruped UE/LE combined (birddog)       TA / Multifidus / Abd Hollow 15x5''     TA March      Bridge 2 x 10     SLR Flex      Prone glut set 15 x 5\"     Clamshells - supine green.  Loop 2 x 15      Standing hip abd / ext X 15 ea B           Squats      Standing Stretch (insert muscle)      manuals 10'         Therapeutic Exercise and NMR EXR  [x] (81949) Provided verbal/tactile cueing for activities related to strengthening, flexibility, endurance, ROM  for improvements in proximal hip and core control with self care, mobility, lifting and ambulation.  [] (17227) Provided verbal/tactile cueing for activities related to improving balance, coordination, kinesthetic sense, posture, motor skill, proprioception  to assist with core control in self care, mobility, lifting, and ambulation. Therapeutic Activities:    [] (53270 or 55785) Provided verbal/tactile cueing for activities related to improving balance, coordination, kinesthetic sense, posture, motor skill, proprioception and motor activation to allow for proper function  with self care and ADLs  [] (65938) Provided training and instruction to the patient for proper core and proximal hip recruitment and positioning with ambulation re-education     Home Exercise Program:    [x] (44047) Reviewed/Progressed HEP activities related to strengthening, flexibility, endurance, ROM of core, proximal hip and LE for functional self-care, mobility, lifting and ambulation   [] (18172) Reviewed/Progressed HEP activities related to improving balance, coordination, kinesthetic sense, posture, motor skill, proprioception of core, proximal hip and LE for self care, mobility, lifting, and ambulation      Manual Treatments: Traction / PA's (Gr I, II, III, IV) / Stretch- Hamstring, Piriformis, Hip Flexor, Groin / STM - right glut med / Sacral Decompression  [x] Provided manual therapy to mobilize soft tissue/joints for the purpose of modulating pain, promoting relaxation, increasing ROM, reducing/eliminating soft tissue swelling/inflammation/restriction, improving soft tissue extensibility and allowing for proper ROM for normal function. (45416). Modalities:   PM + P 15'    Charges:  Timed Code Treatment Minutes: 40   Total Treatment Minutes: 54   Pt.  Was in a hour time slot  [] EVAL (LOW) 99769 (typically 20 minutes face-to-face)  [] EVAL (MOD) 93264 (typically 30 minutes face-to-face)  [] EVAL (HIGH) 70406 (typically 45 minutes face-to-face)  [] RE-EVAL     [x] NL(26905) x  2   [] IONTO  [] NMR (86550) x      [] VASO  [x] Manual (10545) x  1    [] Other:  [] TA x       [] Mech Traction (54092)  [] ES(attended) (81746)      [x] ES (un) (72729):     Goals: Therapist goals for Patient:Lumbar   Short Term Goals: To be achieved in: 2 weeks  1. Independent in HEP and progression per patient tolerance, in order to prevent re-injury. 2. Patient will have a decrease in pain to facilitate improvement in movement, function, and ADLs as indicated by Functional Deficits. Long Term Goals: To be achieved in: 5 weeks  1. Disability index score of 35% or less for the CHRISTELLE to assist with reaching prior level of function. 2. Patient will demonstrate increased AROM to WNL, good LS mobility, good hip ROM to allow for proper joint functioning as indicated by patients Functional Deficits. 3. Patient will demonstrate an increase in Strength to good proximal hip and core activation to allow for proper functional mobility as indicated by patients Functional Deficits. 4. Patient will return to sitting/driving for 30 minutes, functional activities without increased symptoms or restriction. Progression Towards Functional goals:  [] Patient is progressing as expected towards functional goals listed. [] Progression is slowed due to complexities listed. [] Progression has been slowed due to co-morbidities. [x] Plan just implemented, too soon to assess goals progression  [] Other:     ASSESSMENT:  Pt. requires multiple cues with exercises for proper techniques.       Treatment/Activity Tolerance:  [x] Patient tolerated treatment well [] Patient limited by fatique  [] Patient limited by pain  [] Patient limited by other medical complications  [] Other:     Prognosis: [x] Good [] Fair  [] Poor    Patient Requires Follow-up: [x] Yes  [] No    PLAN FOR NEXT SESSION: PLAN: See eval  [x] Continue per plan of care [] Alter current plan (see comments)  [] Plan of care initiated [] Hold pending MD visit [] Discharge    *If patient does not return for further follow ups after this date. Please consider this as the patients discharge from physical therapy.      Electronically signed by: Viral Cardona, [address], Shea [address]

## 2019-05-10 NOTE — TELEPHONE ENCOUNTER
PT CALLING [employer] MUSCLE RELAXER RX. PT STATE IT MAKES HER VERY TIRED UPON WAKING UP. PT WONDERING IF THERE IS A DIFFERENT RX SHE COULD TAKE SINCE SHE IS RETURNING TO WORK ON 5/15/19 AND DOESN'T FEEL SHE CAN WORK WITH THAT MEDICATION IN HER SYSTEM.      PT Donna Cash A CALL BACK [phone]    THE [facility]

## 2019-05-10 NOTE — TELEPHONE ENCOUNTER
Patient is currently taking 4 mg of Zanaflex, she was told to cut the medication in half and only take 2 mg at night. She was told to call the office if she has any problems with this change in the medication. She has continued PT. She is having some improvement with her lower back pain. She does have an office visit 5/23/19. We can discuss further imaging and possible other interventions at this visit.

## 2019-05-13 ENCOUNTER — HOSPITAL ENCOUNTER (OUTPATIENT)
Dept: PHYSICAL THERAPY | Age: 74
Setting detail: THERAPIES SERIES
Discharge: HOME | End: 2019-05-13
Payer: COMMERCIAL

## 2019-05-13 PROCEDURE — 97110 THERAPEUTIC EXERCISES: CPT | Performed by: SPECIALIST/TECHNOLOGIST

## 2019-05-13 PROCEDURE — 97140 MANUAL THERAPY 1/> REGIONS: CPT | Performed by: SPECIALIST/TECHNOLOGIST

## 2019-05-13 PROCEDURE — G0283 ELEC STIM OTHER THAN WOUND: HCPCS | Performed by: SPECIALIST/TECHNOLOGIST

## 2019-05-13 NOTE — FLOWSHEET NOTE
The [address] [place] [address] [facility]    Physical Therapy Daily Treatment Note  Date:  2019    Patient Name:  Quang Page  \"Misa\"  :  1945  MRN: [id number]  Restrictions/Precautions:    Medical/Treatment Diagnosis Information:  · Diagnosis: Low back pain, DDD   M54.5  · Treatment Diagnosis: PT treatment diagnosis:  low back pain, weakness  Insurance/Certification information:  PT Insurance Information: BCBS   75 visits/yr,  $25 copay  Physician Information:  Referring Practitioner: Maria Esther Jarvis  Plan of care signed (Y/N):     Date of Patient follow up with Physician:     G-Code (if applicable):      Date G-Code Applied:         Progress Note: []  Yes  []  No  Next due by: Visit #10      Latex Allergy:  [x]NO      []YES  Preferred Language for Healthcare:   [x]English       []other:    Visit # Insurance Allowable   5 75       Auth Required   []  Yes    [] No    Visits Approved  Date Ranged-       Pain level:  4/10     SUBJECTIVE:   Pt. Reports overall her back has improved with PT.  Pt. C/o increase LBP with prolong sitting and driving. OBJECTIVE:   Observation:   Test measurements:      RESTRICTIONS/PRECAUTIONS: OA, osteopenia, HTN    Exercises/Interventions:       Script:  19  Exercise/Equipment Sets/Reps Notes Last Progression   Hand Knee Rock      Prone Press Up      LTR 20 x 5\"     Piriformis Cross Over/Figure 4 piriformis Stretch 3x30'' B with strap     SKC 2x30'' B with strap      DKC      Prone Quad Stretch      90/90 Hamstring Stretch 2x30'' B with strap     Quadruped UE      Quadruped LE      Quadruped UE/LE combined (birddog)          TA March 10 sets New  - TC     Bridge 3 x 10 Increase reps     SLR Flex      Prone glut set 15 x 5\"     Clamshells - supine green.  Loop 2 x 15      Standing hip abd / ext X 15 ea B           Squats      Standing Stretch (insert muscle)      manuals 10'         Therapeutic Exercise and NMR EXR  [x] (57273) Provided verbal/tactile cueing for activities related to strengthening, flexibility, endurance, ROM  for improvements in proximal hip and core control with self care, mobility, lifting and ambulation.  [] (43749) Provided verbal/tactile cueing for activities related to improving balance, coordination, kinesthetic sense, posture, motor skill, proprioception  to assist with core control in self care, mobility, lifting, and ambulation. Therapeutic Activities:    [] (10123 or 51207) Provided verbal/tactile cueing for activities related to improving balance, coordination, kinesthetic sense, posture, motor skill, proprioception and motor activation to allow for proper function  with self care and ADLs  [] (24852) Provided training and instruction to the patient for proper core and proximal hip recruitment and positioning with ambulation re-education     Home Exercise Program:    [x] (74873) Reviewed/Progressed HEP activities related to strengthening, flexibility, endurance, ROM of core, proximal hip and LE for functional self-care, mobility, lifting and ambulation   [] (89756) Reviewed/Progressed HEP activities related to improving balance, coordination, kinesthetic sense, posture, motor skill, proprioception of core, proximal hip and LE for self care, mobility, lifting, and ambulation      Manual Treatments: Traction / PA's (Gr I, II, III, IV) / Stretch- Hamstring, Piriformis, Hip Flexor, Groin / STM - right glut med / Sacral Decompression  [x] Provided manual therapy to mobilize soft tissue/joints for the purpose of modulating pain, promoting relaxation, increasing ROM, reducing/eliminating soft tissue swelling/inflammation/restriction, improving soft tissue extensibility and allowing for proper ROM for normal function. (90715). Modalities:   PM + MHP 15'    Charges:  Timed Code Treatment Minutes: 40   Total Treatment Minutes: 54   Pt.  Was in a hour time slot  [] ARABELLAAL (LOW) 61414 (typically 20 minutes face-to-face)  [] EVAL (MOD) 05714 (typically 30 minutes face-to-face)  [] EVAL (HIGH) 86190 (typically 45 minutes face-to-face)  [] RE-EVAL     [x] AK(10763) x  2   [] IONTO  [] NMR (34722) x      [] VASO  [x] Manual (10298) x  1    [] Other:  [] TA x       [] Mech Traction (29338)  [] ES(attended) (62938)      [x] ES (un) (65336):     Goals: Therapist goals for Patient:Lumbar   Short Term Goals: To be achieved in: 2 weeks  1. Independent in HEP and progression per patient tolerance, in order to prevent re-injury. 2. Patient will have a decrease in pain to facilitate improvement in movement, function, and ADLs as indicated by Functional Deficits. Long Term Goals: To be achieved in: 5 weeks  1. Disability index score of 35% or less for the CHRISTELLE to assist with reaching prior level of function. 2. Patient will demonstrate increased AROM to WNL, good LS mobility, good hip ROM to allow for proper joint functioning as indicated by patients Functional Deficits. 3. Patient will demonstrate an increase in Strength to good proximal hip and core activation to allow for proper functional mobility as indicated by patients Functional Deficits. 4. Patient will return to sitting/driving for 30 minutes, functional activities without increased symptoms or restriction. Progression Towards Functional goals:  [] Patient is progressing as expected towards functional goals listed. [] Progression is slowed due to complexities listed. [] Progression has been slowed due to co-morbidities. [x] Plan just implemented, too soon to assess goals progression  [] Other:     ASSESSMENT:  Pt. requires multiple cues with exercises for proper techniques.       Treatment/Activity Tolerance:  [x] Patient tolerated treatment well [] Patient limited by fatique  [] Patient limited by pain  [] Patient limited by other medical complications  [] Other:     Prognosis: [x] Good [] Fair  [] Poor    Patient Requires Follow-up: [x] Yes  []

## 2019-05-14 NOTE — PROGRESS NOTES
I have reviewed and agree to the content of the note created by the Physical Therapist Assistant.     Electronically signed by Cintia Vizcaino PT

## 2019-05-22 ENCOUNTER — OFFICE VISIT (OUTPATIENT)
Dept: ORTHOPEDIC SURGERY | Age: 74
End: 2019-05-22
Payer: COMMERCIAL

## 2019-05-22 ENCOUNTER — HOSPITAL ENCOUNTER (OUTPATIENT)
Dept: PHYSICAL THERAPY | Age: 74
Setting detail: THERAPIES SERIES
Discharge: HOME | End: 2019-05-22
Payer: COMMERCIAL

## 2019-05-22 VITALS
HEIGHT: 60 IN | HEART RATE: 79 BPM | DIASTOLIC BLOOD PRESSURE: 79 MMHG | WEIGHT: 215 LBS | SYSTOLIC BLOOD PRESSURE: 153 MMHG | BODY MASS INDEX: 42.21 KG/M2 | RESPIRATION RATE: 14 BRPM

## 2019-05-22 DIAGNOSIS — M47.816 SPONDYLOSIS OF LUMBAR REGION WITHOUT MYELOPATHY OR RADICULOPATHY: Primary | ICD-10-CM

## 2019-05-22 DIAGNOSIS — M51.36 DDD (DEGENERATIVE DISC DISEASE), LUMBAR: ICD-10-CM

## 2019-05-22 DIAGNOSIS — M54.50 LUMBAR PAIN: ICD-10-CM

## 2019-05-22 PROCEDURE — 97110 THERAPEUTIC EXERCISES: CPT | Performed by: SPECIALIST/TECHNOLOGIST

## 2019-05-22 PROCEDURE — 99213 OFFICE O/P EST LOW 20 MIN: CPT | Performed by: PHYSICAL MEDICINE & REHABILITATION

## 2019-05-22 PROCEDURE — 97140 MANUAL THERAPY 1/> REGIONS: CPT | Performed by: SPECIALIST/TECHNOLOGIST

## 2019-05-22 PROCEDURE — G0283 ELEC STIM OTHER THAN WOUND: HCPCS | Performed by: SPECIALIST/TECHNOLOGIST

## 2019-05-22 NOTE — FLOWSHEET NOTE
The [address] [place] [address] and Sports [facility]    Physical Therapy Daily Treatment Note  Date:  2019    Patient Name:  Dario Peña  \"Misa\"  :  1945  MRN: [id number]  Restrictions/Precautions:    Medical/Treatment Diagnosis Information:  · Diagnosis: Low back pain, DDD   M54.5  · Treatment Diagnosis: PT treatment diagnosis:  low back pain, weakness  Insurance/Certification information:  PT Insurance Information: BCBS   75 visits/yr,  $25 copay  Physician Information:  Referring Practitioner: Fidel Lou  Plan of care signed (Y/N):     Date of Patient follow up with Physician:     G-Code (if applicable):      Date G-Code Applied:         Progress Note: []  Yes  []  No  Next due by: Visit #10      Latex Allergy:  [x]NO      []YES  Preferred Language for Healthcare:   [x]English       []other:    Visit # Insurance Allowable   7 75       Auth Required   []  Yes    [] No    Visits Approved  Date Ranged-       Pain level:  3/10     SUBJECTIVE:   Pt. Reports the MD was happy with the progress being made with her back.   Pt. Reports she continues to have less LBP with PT.       OBJECTIVE:   Observation:   Test measurements:      RESTRICTIONS/PRECAUTIONS: OA, osteopenia, HTN    Exercises/Interventions:       Script:  19  Exercise/Equipment Sets/Reps Notes Last Progression   Hand Knee Rock      Prone Press Up      LTR 20 x 5\"     Piriformis Cross Over/Figure 4 piriformis Stretch 3x30'' B with strap     SKC 2x30'' B with strap      DKC      Prone Quad Stretch      90/90 Hamstring Stretch 2x30'' B with strap     Quadruped UE      Quadruped LE      Quadruped UE/LE combined (birddog)          : UE/ opp LE 15 x each     Bridge w/ ball squeeze 3 x 10     SLR Flex      SLR abd 2x10 B NV    Prone glut set 15 x 5\"     Clamshells -  [place] [address] 2 x 15 B Side lying    Standing hip abd / ext X 15 ea B           Squats      Standing Stretch (insert muscle)      manuals 10' Therapeutic Exercise and NMR EXR  [x] (73429) Provided verbal/tactile cueing for activities related to strengthening, flexibility, endurance, ROM  for improvements in proximal hip and core control with self care, mobility, lifting and ambulation.  [] (81075) Provided verbal/tactile cueing for activities related to improving balance, coordination, kinesthetic sense, posture, motor skill, proprioception  to assist with core control in self care, mobility, lifting, and ambulation. Therapeutic Activities:    [] (49900 or 64775) Provided verbal/tactile cueing for activities related to improving balance, coordination, kinesthetic sense, posture, motor skill, proprioception and motor activation to allow for proper function  with self care and ADLs  [] (54683) Provided training and instruction to the patient for proper core and proximal hip recruitment and positioning with ambulation re-education     Home Exercise Program:    [x] (09443) Reviewed/Progressed HEP activities related to strengthening, flexibility, endurance, ROM of core, proximal hip and LE for functional self-care, mobility, lifting and ambulation   [] (17882) Reviewed/Progressed HEP activities related to improving balance, coordination, kinesthetic sense, posture, motor skill, proprioception of core, proximal hip and LE for self care, mobility, lifting, and ambulation      Manual Treatments: Traction / PA's (Gr I, II, III, IV) / Stretch- Hamstring, Piriformis, Hip Flexor, Groin / STM - right glut med / Sacral Decompression  [x] Provided manual therapy to mobilize soft tissue/joints for the purpose of modulating pain, promoting relaxation, increasing ROM, reducing/eliminating soft tissue swelling/inflammation/restriction, improving soft tissue extensibility and allowing for proper ROM for normal function. (59576). Modalities:   PM + MHP 15'    Charges:  Timed Code Treatment Minutes: 40   Total Treatment Minutes: 54   Pt.  Was in a hour time slot  [] EVAL (LOW) 45486 (typically 20 minutes face-to-face)  [] EVAL (MOD) 94910 (typically 30 minutes face-to-face)  [] EVAL (HIGH) 42788 (typically 45 minutes face-to-face)  [] RE-EVAL     [x] CU(15084) x  2   [] IONTO  [] NMR (09587) x      [] VASO  [x] Manual (58562) x  1    [] Other:  [] TA x       [] Mech Traction (92170)  [] ES(attended) (97843)      [x] ES (un) (70335):     Goals: Therapist goals for Patient:Lumbar   Short Term Goals: To be achieved in: 2 weeks  1. Independent in HEP and progression per patient tolerance, in order to prevent re-injury. 2. Patient will have a decrease in pain to facilitate improvement in movement, function, and ADLs as indicated by Functional Deficits. Long Term Goals: To be achieved in: 5 weeks  1. Disability index score of 35% or less for the CHRISTELLE to assist with reaching prior level of function. 2. Patient will demonstrate increased AROM to WNL, good LS mobility, good hip ROM to allow for proper joint functioning as indicated by patients Functional Deficits. 3. Patient will demonstrate an increase in Strength to good proximal hip and core activation to allow for proper functional mobility as indicated by patients Functional Deficits. 4. Patient will return to sitting/driving for 30 minutes, functional activities without increased symptoms or restriction. Progression Towards Functional goals:  [] Patient is progressing as expected towards functional goals listed. [] Progression is slowed due to complexities listed. [x] Progression has been slowed due to co-morbidities. [] Plan just implemented, too soon to assess goals progression  [] Other:     ASSESSMENT:   Tolerated Rx well. Fatigued easily with glut med strengthening exercises. Emphasized the importance and changing positions at work every 15 minutes.      Treatment/Activity Tolerance:  [x] Patient tolerated treatment well [] Patient limited by fatique  [] Patient limited by pain  [] Patient limited by other medical complications  [] Other:     Prognosis: [x] Good [] Fair  [] Poor    Patient Requires Follow-up: [x] Yes  [] No    PLAN FOR NEXT SESSION:     PLAN: See eval  [x] Continue per plan of care [] Alter current plan (see comments)  [] Plan of care initiated [] Hold pending MD visit [] Discharge    *If patient does not return for further follow ups after this date. Please consider this as the patients discharge from physical therapy.      Electronically signed by: Angeles Prasad, [address], Charla Billy PT [id number]

## 2019-05-22 NOTE — PROGRESS NOTES
I have reviewed and agree to the content of the note created by the Physical Therapist Assistant.     Electronically signed by Yuri Shah PT

## 2019-05-22 NOTE — PROGRESS NOTES
Past Surgical History:     Past Surgical History:   Procedure Laterality Date    APPENDECTOMY      BLADDER SURGERY      polyps    HYSTERECTOMY, TOTAL ABDOMINAL       Current Medications:     Current Outpatient Medications:     methylPREDNISolone (MEDROL, DEION,) 4 MG tablet, Take by mouth., Disp: 1 kit, Rfl: 0    tiZANidine (ZANAFLEX) 4 MG tablet, Take 1 tablet by mouth nightly, Disp: 30 tablet, Rfl: 0    pravastatin (PRAVACHOL) 40 MG tablet, TAKE 1 TABLET BY MOUTH DAILY, Disp: 90 tablet, Rfl: 3    metoprolol succinate (TOPROL XL) 50 MG extended release tablet, Take 1 tablet by mouth daily For high blood pressure, Disp: 90 tablet, Rfl: 2    hydrochlorothiazide (HYDRODIURIL) 25 MG tablet, 1 tab po daily for htn, Disp: 90 tablet, Rfl: 3    diclofenac sodium (VOLTAREN) 1 % GEL, Apply 4 g topically 4 times daily as needed Apply 4 gram 4 times a day, Disp: 500 g, Rfl: 2    PAZEO 0.7 % SOLN, , Disp: , Rfl:     fluticasone (FLONASE) 50 MCG/ACT nasal spray, 2 sprays each nostril daily, Disp: 1 Bottle, Rfl: 2    Cholecalciferol (VITAMIN D) 2000 UNITS CAPS capsule, Take 1 capsule by mouth daily. , Disp: , Rfl:   Allergies:  Bactrim; Prochlorperazine edisylate; Prochlorperazine maleate; Sulfamethoxazole-trimethoprim; and Amoxicillin  Social History:    reports that she has never smoked. She has never used smokeless tobacco. She reports that she drinks alcohol. She reports that she does not use drugs. Family History:   Family History   Problem Relation Age of Onset   [employer] Alzheimer's Disease Mother     Asthma Brother     Hypertension Sister        REVIEW OF SYSTEMS:   CONSTITUTIONAL: Denies unexplained weight loss, fevers, chills or fatigue  NEUROLOGICAL: Denies unsteady gait or progressive weakness  MUSCULOSKELETAL: Denies joint swelling or redness  GI: Denies nausea, vomiting, diarrhea   : Denies bowel or bladder issues       PHYSICAL EXAM:    Vitals: Blood pressure (!) 153/79, pulse 79, resp.  rate 14, height 5' (1.524 m), weight 215 lb (97.5 kg). GENERAL EXAM:  · General Apparence: Patient is adequately groomed with no evidence of malnutrition. · Psychiatric: Orientation: The patient is oriented to time, place and person. The patient's mood and affect are appropriate   · Vascular: Examination reveals no swelling and palpation reveals no tenderness in upper or lower extremities. Good capillary refill. · Sensation is intact without deficit in the upper and lower extremities to light touch and pinprick  · Coordination of the upper and lower extremities are normal.      LUMBAR/SACRAL EXAMINATION:  · Inspection: Local inspection shows no step-off or bruising. Lumbar alignment is normal. No instability is noted. · Palpation:   No evidence of tenderness at the midline. Lumbar paraspinal tenderness: No tenderness to palpation of the lumbar paraspinals  Bursal tenderness No tenderness bilaterally  There is no paraspinal spasm. · Range of Motion: limited by 25% in all planes due to pain  · Strength:   Strength testing is 5/5 in all muscle groups tested. · Special Tests:   Straight leg raise and crossed SLR negative  · Skin: There are no rashes, ulcerations or lesions. · Reflexes: Reflexes are symmetrically 1+ at the patellar and ankle tendons. Clonus absent bilaterally at the feet. · Gait & station: normal, patient ambulates without assistance  · Additional Examinations:  · RIGHT LOWER EXTREMITY: Inspection/examination of the right lower extremity does not show any tenderness, deformity or injury. Range of motion is normal and pain-free. There is no gross instability. There are no rashes, ulcerations or lesions. Strength and tone are normal. No atrophy or abnormal movements are noted. · LEFT LOWER EXTREMITY:  Inspection/examination of the left lower extremity does not show any tenderness, deformity or injury. Range of motion is normal and pain-free. There is no gross instability.  There are no rashes, ulcerations or lesions. Strength and tone are normal. No atrophy or abnormal movements are noted. Diagnostic Testing:    No new diagnostics  Results for orders placed or performed in visit on 17   Microalbumin / Creatinine Urine Ratio   Result Value Ref Range    Microalbumin, Random Urine 3.00 (H) <2.0 mg/dL    Creatinine, Ur 189.4 28.0 - 259.0 mg/dL    Microalbumin Creatinine Ratio 15.8 0.0 - 30.0 mg/g     Impression:       1. Spondylosis of lumbar region without myelopathy or radiculopathy    2. DDD (degenerative disc disease), lumbar    3. Lumbar pain        Plan:  Clinical Course: Above diagnoses are improving     I discussed the diagnosis and the treatment options with Veto Almeida today. In Summary:  The various treatment options were outlined and discussed with Veto Almeida including:  Conservative care options: physical therapy, ice, medications, bracing, and activity modification. The indications for therapeutic injections. The indications for additional imaging/laboratory studies. The indications for (possible future) interventions. After considering the various options discussed, Veto Almeida elected to pursue a course of treatment that includes the followin. Medications:  Continue anti-inflammatories with appropriate GI Precautions including to stop if develop dark tarry stools or GI upset and to take with food. 2. PT:  Encouraged to continue with HEP. 3. Further studies:  No further studies. 4. Interventional:  none at this time    5. Follow up:  4-6 weeks      Veot Almeida was instructed to call the office if her symptoms worsen or if new symptoms appear prior to the next scheduled visit. She is specifically instructed to contact the office between now & her scheduled appointment if she has concerns related to her condition or if she needs assistance in scheduling the above tests.  She is welcome to call for an appointment sooner if she has any additional concerns or questions. Rosendo Mcdonald CRMA, am scribing for and in the presence of Dr. Ramesh Skaggs.  05/22/19 5:40 PM Marcie Sweeney CRMA. I, Dr. Ramesh Skaggs, personally performed the services described in this documentation as scribed by Marcie Sweeney CRMA in my presence and it is both accurate and complete. Kenneth Koehler. Helio Sotelo MD, WERO, [facility]  Board Certified in [address] [place] [address] Certified and Fellowship Trained in [facility] ([facility])             This dictation was performed with a verbal recognition program [facility]) and it was checked for errors. It is possible that there are still dictated errors within this office note. If so, please bring any errors to my attention for an addendum. All efforts were made to ensure that this office note is accurate.

## 2019-05-29 ENCOUNTER — HOSPITAL ENCOUNTER (OUTPATIENT)
Dept: PHYSICAL THERAPY | Age: 74
Setting detail: THERAPIES SERIES
Discharge: HOME | End: 2019-05-29
Payer: COMMERCIAL

## 2019-05-29 PROCEDURE — G0283 ELEC STIM OTHER THAN WOUND: HCPCS | Performed by: SPECIALIST/TECHNOLOGIST

## 2019-05-29 PROCEDURE — 97140 MANUAL THERAPY 1/> REGIONS: CPT | Performed by: SPECIALIST/TECHNOLOGIST

## 2019-05-29 PROCEDURE — 97110 THERAPEUTIC EXERCISES: CPT | Performed by: SPECIALIST/TECHNOLOGIST

## 2019-05-29 NOTE — FLOWSHEET NOTE
The [address] [place] [address] [facility]    Physical Therapy Daily Treatment Note  Date:  2019    Patient Name:  Payton Callejas  \"Msia\"  :  1945  MRN: [id number]  Restrictions/Precautions:    Medical/Treatment Diagnosis Information:  · Diagnosis: Low back pain, DDD   M54.5  · Treatment Diagnosis: PT treatment diagnosis:  low back pain, weakness  Insurance/Certification information:  PT Insurance Information: BCBS   75 visits/yr,  $25 copay  Physician Information:  Referring Practitioner: Román Preston  Plan of care signed (Y/N):     Date of Patient follow up with Physician:     G-Code (if applicable):      Date G-Code Applied:         Progress Note: []  Yes  []  No  Next due by: Visit #10      Latex Allergy:  [x]NO      []YES  Preferred Language for Healthcare:   [x]English       []other:    Visit # Insurance Allowable   8 75       Auth Required   []  Yes    [] No    Visits Approved  Date Ranged-       Pain level:  3/10     SUBJECTIVE:   Pt. Reports her back pain increased over the weekend from doing house chores and 5 loads of tijerina. Pt. Reports her back has been sore since being really active over the weekend.         OBJECTIVE:   Observation:   Test measurements:      RESTRICTIONS/PRECAUTIONS: OA, osteopenia, HTN    Exercises/Interventions:       Script:  19  Exercise/Equipment Sets/Reps Notes Last Progression   Hand Knee Rock      Prone Press Up      LTR 20 x 5\"     Piriformis Cross Over/Figure 4 piriformis Stretch 3x30'' B with strap     SKC 2x30'' B with strap      DKC      Prone Quad Stretch      90/90 Hamstring Stretch 2x30'' B with strap     Quadruped UE      Quadruped LE      Quadruped UE/LE combined (birddog)          TA March: UE/ opp LE 15 x each NV    Bridge w/ ball squeeze 3 x 10     SLR Flex      SLR abd 2x10 B NV    Prone glut set 15 x 5\"     Clamshells -  [place] Loop 2 x 15 B Side lying    Standing hip abd / ext X 15 ea B NV Squats      Reviewed HEP Performed      manuals 10'         Therapeutic Exercise and NMR EXR  [x] (43240) Provided verbal/tactile cueing for activities related to strengthening, flexibility, endurance, ROM  for improvements in proximal hip and core control with self care, mobility, lifting and ambulation.  [] (49639) Provided verbal/tactile cueing for activities related to improving balance, coordination, kinesthetic sense, posture, motor skill, proprioception  to assist with core control in self care, mobility, lifting, and ambulation. Therapeutic Activities:    [] (55321 or 71602) Provided verbal/tactile cueing for activities related to improving balance, coordination, kinesthetic sense, posture, motor skill, proprioception and motor activation to allow for proper function  with self care and ADLs  [] (71814) Provided training and instruction to the patient for proper core and proximal hip recruitment and positioning with ambulation re-education     Home Exercise Program:    [x] (57928) Reviewed/Progressed HEP activities related to strengthening, flexibility, endurance, ROM of core, proximal hip and LE for functional self-care, mobility, lifting and ambulation   [] (51589) Reviewed/Progressed HEP activities related to improving balance, coordination, kinesthetic sense, posture, motor skill, proprioception of core, proximal hip and LE for self care, mobility, lifting, and ambulation      Manual Treatments: Traction / PA's (Gr I, II, III, IV) / Stretch- Hamstring, Piriformis, Hip Flexor, Groin / STM - right glut med / Sacral Decompression  [x] Provided manual therapy to mobilize soft tissue/joints for the purpose of modulating pain, promoting relaxation, increasing ROM, reducing/eliminating soft tissue swelling/inflammation/restriction, improving soft tissue extensibility and allowing for proper ROM for normal function. (66652).      Modalities:   PM + MHP 15'    Charges:  Timed Code Treatment Minutes: 30 Total Treatment Minutes: 55     [] EVAL (LOW) 28676 (typically 20 minutes face-to-face)  [] EVAL (MOD) 33050 (typically 30 minutes face-to-face)  [] EVAL (HIGH) 51216 (typically 45 minutes face-to-face)  [] RE-EVAL     [x] PG(22934) x  1   [] IONTO  [] NMR (53490) x      [] VASO  [x] Manual (01546) x  1    [] Other:  [] TA x       [] Mech Traction (95471)  [] ES(attended) (99185)      [x] ES (un) (33287):     Goals: Therapist goals for Patient:Lumbar   Short Term Goals: To be achieved in: 2 weeks  1. Independent in HEP and progression per patient tolerance, in order to prevent re-injury. 2. Patient will have a decrease in pain to facilitate improvement in movement, function, and ADLs as indicated by Functional Deficits. Long Term Goals: To be achieved in: 5 weeks  1. Disability index score of 35% or less for the CHRISTELLE to assist with reaching prior level of function. 2. Patient will demonstrate increased AROM to WNL, good LS mobility, good hip ROM to allow for proper joint functioning as indicated by patients Functional Deficits. 3. Patient will demonstrate an increase in Strength to good proximal hip and core activation to allow for proper functional mobility as indicated by patients Functional Deficits. 4. Patient will return to sitting/driving for 30 minutes, functional activities without increased symptoms or restriction. Progression Towards Functional goals:  [] Patient is progressing as expected towards functional goals listed. [] Progression is slowed due to complexities listed. [x] Progression has been slowed due to co-morbidities. [] Plan just implemented, too soon to assess goals progression  [] Other:     ASSESSMENT:   Tolerated Rx well. Fatigued easily with glut med strengthening exercises. Emphasized the importance and changing positions at work every 15 minutes.      Treatment/Activity Tolerance:  [x] Patient tolerated treatment well [] Patient limited by pee  [] Patient limited by pain  [] Patient limited by other medical complications  [] Other:     Prognosis: [x] Good [] Fair  [] Poor    Patient Requires Follow-up: [x] Yes  [] No    PLAN FOR NEXT SESSION:     PLAN: See eval  [x] Continue per plan of care [] Alter current plan (see comments)  [] Plan of care initiated [] Hold pending MD visit [] Discharge    *If patient does not return for further follow ups after this date. Please consider this as the patients discharge from physical therapy.      Electronically signed by: Dustin Duncan, Kanika Hough [address], Shea Cordero 1

## 2019-06-05 ENCOUNTER — HOSPITAL ENCOUNTER (OUTPATIENT)
Dept: PHYSICAL THERAPY | Age: 74
Setting detail: THERAPIES SERIES
Discharge: HOME | End: 2019-06-05
Payer: COMMERCIAL

## 2019-06-05 PROCEDURE — 97140 MANUAL THERAPY 1/> REGIONS: CPT | Performed by: SPECIALIST/TECHNOLOGIST

## 2019-06-05 PROCEDURE — G0283 ELEC STIM OTHER THAN WOUND: HCPCS | Performed by: SPECIALIST/TECHNOLOGIST

## 2019-06-05 PROCEDURE — 97110 THERAPEUTIC EXERCISES: CPT | Performed by: SPECIALIST/TECHNOLOGIST

## 2019-06-05 NOTE — FLOWSHEET NOTE
[address] [facility]    Physical Therapy Daily Treatment Note  Date:  2019    Patient Name:  Fernando Hills  \"Misa\"  :  1945  MRN: [id number]  Restrictions/Precautions:    Medical/Treatment Diagnosis Information:  · Diagnosis: Low back pain, DDD   M54.5  · Treatment Diagnosis: PT treatment diagnosis:  low back pain, weakness  Insurance/Certification information:  PT Insurance Information: BCBS   75 visits/yr,  $25 copay  Physician Information:  Referring Practitioner: Yash Billy  Plan of care signed (Y/N):     Date of Patient follow up with Physician:     G-Code (if applicable):      Date G-Code Applied:         Progress Note: []  Yes  [x]  No  Next due by: Visit #10      Latex Allergy:  [x]NO      []YES  Preferred Language for Healthcare:   [x]English       []other:    Visit # Insurance Allowable   9 76       Auth Required   []  Yes    [] No    Visits Approved  Date Ranged-       Pain level:  3/10     SUBJECTIVE:   See D/C note     OBJECTIVE:   Observation:   Test measurements:      RESTRICTIONS/PRECAUTIONS: OA, osteopenia, HTN    Exercises/Interventions:       Script:  19  Exercise/Equipment Sets/Reps Notes Last Progression   Hand Knee Rock      Prone Press Up      LTR 20 x 5\"     Piriformis Cross Over/Figure 4 piriformis Stretch 3x30'' B with strap     SKC 2x30'' B with strap      DKC      Prone Quad Stretch      90/90 Hamstring Stretch 2x30'' B with strap     Quadruped UE      Quadruped LE      Quadruped UE/LE combined ([place])          : UE/ opp LE 15 x each NV    Bridge w/ ball squeeze 3 x 10     SLR Flex      SLR abd 2x10 B NV    Prone glut set 15 x 5\"     Clamshells -  [place] Loop 2 x 15 B Side lying    Standing hip abd / ext X 15 ea B NV          Squats      Reviewed HEP Performed      manuals 10'         Therapeutic Exercise and NMR EXR  [x] (89077) Provided verbal/tactile cueing for activities related to strengthening, flexibility, endurance, ROM  for improvements in proximal hip and core control with self care, mobility, lifting and ambulation.  [] (17574) Provided verbal/tactile cueing for activities related to improving balance, coordination, kinesthetic sense, posture, motor skill, proprioception  to assist with core control in self care, mobility, lifting, and ambulation. Therapeutic Activities:    [] (07967 or 67631) Provided verbal/tactile cueing for activities related to improving balance, coordination, kinesthetic sense, posture, motor skill, proprioception and motor activation to allow for proper function  with self care and ADLs  [] (09817) Provided training and instruction to the patient for proper core and proximal hip recruitment and positioning with ambulation re-education     Home Exercise Program:    [x] (06787) Reviewed/Progressed HEP activities related to strengthening, flexibility, endurance, ROM of core, proximal hip and LE for functional self-care, mobility, lifting and ambulation   [] (95155) Reviewed/Progressed HEP activities related to improving balance, coordination, kinesthetic sense, posture, motor skill, proprioception of core, proximal hip and LE for self care, mobility, lifting, and ambulation      Manual Treatments: Traction / PA's (Gr I, II, III, IV) / Stretch- Hamstring, Piriformis, Hip Flexor, Groin / STM - right glut med / Sacral Decompression  [x] Provided manual therapy to mobilize soft tissue/joints for the purpose of modulating pain, promoting relaxation, increasing ROM, reducing/eliminating soft tissue swelling/inflammation/restriction, improving soft tissue extensibility and allowing for proper ROM for normal function. (16186).      Modalities:   PM + P 15'    Charges:  Timed Code Treatment Minutes: 40   Total Treatment Minutes: 55     [] EVAL (LOW) 80821 (typically 20 minutes face-to-face)  [] EVAL (MOD) 68475 (typically 30 minutes face-to-face)  [] EVAL (HIGH) 49310 (typically 45 minutes face-to-face)  [] RE-EVAL     [x] YP(08936) x  2   [] IONTO  [] NMR (16369) x      [] VASO  [x] Manual (57244) x  1    [] Other:  [] TA x       [] Mech Traction (73382)  [] ES(attended) (63557)      [x] ES (un) (24705):     Goals: Therapist goals for Patient:Lumbar   Short Term Goals: To be achieved in: 2 weeks  1. Independent in HEP and progression per patient tolerance, in order to prevent re-injury. 2. Patient will have a decrease in pain to facilitate improvement in movement, function, and ADLs as indicated by Functional Deficits. Long Term Goals: To be achieved in: 5 weeks  1. Disability index score of 35% or less for the CHRISTELLE to assist with reaching prior level of function. 2. Patient will demonstrate increased AROM to WNL, good LS mobility, good hip ROM to allow for proper joint functioning as indicated by patients Functional Deficits. 3. Patient will demonstrate an increase in Strength to good proximal hip and core activation to allow for proper functional mobility as indicated by patients Functional Deficits. 4. Patient will return to sitting/driving for 30 minutes, functional activities without increased symptoms or restriction. Progression Towards Functional goals:  [] Patient is progressing as expected towards functional goals listed. [] Progression is slowed due to complexities listed. [x] Progression has been slowed due to co-morbidities.   [] Plan just implemented, too soon to assess goals progression  [] Other:     ASSESSMENT:   See D/C note     Treatment/Activity Tolerance:  [x] Patient tolerated treatment well [] Patient limited by fatique  [] Patient limited by pain  [] Patient limited by other medical complications  [] Other:     Prognosis: [x] Good [] Fair  [] Poor    Patient Requires Follow-up: [] Yes  [x] No    PLAN FOR NEXT SESSION:     PLAN: See eval  [] Continue per plan of care [] Alter current plan (see comments)  [] Plan of care initiated [] Hold pending MD visit [x] Discharge    *If patient does not return for further follow ups after this date. Please consider this as the patients discharge from physical therapy.      Electronically signed by: Any Benavidez, [address], Shea Rodríguez 1

## 2019-06-05 NOTE — DISCHARGE SUMMARY
The [address] [facility] and [address] [facility]       Physical Therapy Discharge  Date: 2019        Patient Name:  Payton Callejas    :  1945  MRN: [id number]  Referring Physician:Arturo Nowak  Diagnosis:LBP, LE weakness                        ICD Code:M54.4  [] Surgical [x] Conservative  Therapy Diagnosis/Practice Pattern:LBP      Total number of visits:  9  Reporting Period:   Beginning Date:19   End Date:19    OBJECTIVE  Test used Initial score Discharge Score   Pain Summary  4/10 3/10   Functional questionnaire Mod CHRISTELLE 68% 24%   Functional Testing              R/L R/L   ROM Trunk flex 75% WNL    ext 50% WNL    SB 50% B WNL    ROT 50% WNL   Strength Hip flex 4/5  5/5 4+/5  5/5    Hip abd 4/5  4+/5 4+/5 B              Co-morbidities/Complexities (which will affect course of rehabilitation):   []None        Arthritic conditions   []Rheumatoid arthritis (M05.9)  [x]Osteoarthritis (M19.91) Cardiovascular conditions   [x]Hypertension (I10)  []Hyperlipidemia (E78.5)  []Angina pectoris (I20)  []Atherosclerosis (I70) Musculoskeletal conditions   []Disc pathology   []Congenital spine pathologies   []Prior surgical intervention  []Osteoporosis (M81.8)  [x]Osteopenia (M85.8)   Endocrine conditions   []Hypothyroid (E03.9)  []Hyperthyroid Gastrointestinal conditions   []Constipation (V37.41) Metabolic conditions   []Morbid obesity (E66.01)  []Diabetes type 1(E10.65) or 2 (E11.65)   []Neuropathy (G60.9)   Pulmonary conditions   []Asthma (J45)  []Coughing   []COPD (J44.9) Psychological Disorders  []Anxiety (F41.9)  []Depression (F32.9)   []Other: []Other:        Treatment to date:  [x] Therapeutic Exercise    [x] Modalities:  [] Therapeutic Activity             []Ultrasound            [x]Electrical Stimulation  [] Gait Training     []Cervical Traction    [] Lumbar Traction  [] Neuromuscular Re-education [x] Cold/hotpack         []Iontophoresis  [x] Instruction in HEP Other:  [x] Manual Therapy                   []                       ?           []   Assessment:  [x] All Goals were achieved. [] The following goals were achieved (#'s):  [] The following goals were not achieved for the following reasons:  Functional/assessment of improvement as it relates to each goal: ROM, strength, and pain levels with ADL's have improved. Pt. Able to walk 1 mile without increase in symptoms. Plan of Care:  [x] Discharge from Therapy Services due to:    Reason for Discharge:   [x] All goals achieved    [] Patient having surgery  [] Physician discontinued therapy  [] Insurance/Financial Limitations [] Patient did not return for follow up visits [] Home program/1 visit only   [] No subjective or objective improvement [] Plateaued   [] Patient was unable to adhere to the plan of care established at evaluation. [] Referred back to physician for re-evaluation and did not return.      [] Other:?       Electronically signed by: Wesley Gooden PT [id number]

## 2020-04-01 ENCOUNTER — OFFICE VISIT (OUTPATIENT)
Dept: ORTHOPEDIC SURGERY | Age: 75
End: 2020-04-01
Payer: COMMERCIAL

## 2020-04-01 VITALS
WEIGHT: 214.95 LBS | BODY MASS INDEX: 42.2 KG/M2 | DIASTOLIC BLOOD PRESSURE: 79 MMHG | SYSTOLIC BLOOD PRESSURE: 135 MMHG | HEIGHT: 60 IN

## 2020-04-01 PROCEDURE — 99213 OFFICE O/P EST LOW 20 MIN: CPT | Performed by: PHYSICIAN ASSISTANT

## 2020-04-01 RX ORDER — METHYLPREDNISOLONE 4 MG/1
TABLET ORAL
Qty: 1 KIT | Refills: 0 | Status: SHIPPED | OUTPATIENT
Start: 2020-04-01 | End: 2020-04-13

## 2020-04-01 NOTE — PROGRESS NOTES
Follow up: Nathen Gonzáles  1945  [id number]         Chief Complaint   Patient presents with    Lower Back Pain     F/u LSP.  LOV 5/22/19         HISTORY OF PRESENT ILLNESS:  Ms. Gio Alvarez is a 69 y.o. female returns for a follow up visit for multiple medical problems. Her current presenting problems are   1. Spondylosis of lumbar region without myelopathy or radiculopathy    2. DDD (degenerative disc disease), lumbar    3. Spondylolisthesis of lumbar region    4. HNP (herniated nucleus pulposus), lumbar    . As per information/history obtained from the PADT(patient assessment and documentation tool) - She complains of pain in the lower back with radiation to the mid back, buttocks, hips Right and upper leg Right She rates the pain 5/10 and describes it as burning, throbbing, stinging. Pain is made worse by: sitting, reaching. She denies side effects from the current pain regimen. Patient reports that since the last follow up visit the physical functioning is worse, family/social relationships are worse, mood is worse and sleep patterns are worse, and that the overall functioning is worse. Patient denies neurological bowel or bladder. The patient was last seen in the office on 5/22/2019 after completing physical therapy and continual exercises through a home exercise program.  Her pain began to worsen on 2/20/2020 in the lower back specifically right greater than left however it is across the entire lower back and extending into the lower mid back on occasion. She describes that walking will sometimes make it worse yet sometimes make it better so she is trying to not push herself. She describes that she is taking more Tylenol than she would like to. She has been treated in the past with a Medrol Dosepak which did help with the pain.       Associated signs and symptoms:   Neurogenic bowel or bladder symptoms:  no   Perceived weakness:  no   Difficulty walking:  no              Past Medical tenderness: Mild L4/5 and L5/S1 tenderness  Bursal tenderness No tenderness bilaterally  There is no paraspinal spasm. · Range of Motion: limited by 50% in all planes due to pain  · Strength:   Strength testing is 5/5 in all muscle groups tested. · Special Tests:   Straight leg raise and crossed SLR negative. Alfredito's testing is negative bilaterally. FADIR's testing is negative bilaterally. Bowstring test negative. Slump test negative. · Skin: There are no rashes, ulcerations or lesions. · Reflexes: Reflexes are symmetrically 2+ at the patellar and ankle tendons. Clonus absent bilaterally at the feet. · Gait & station: normal, patient ambulates without assistance and no ataxia  · Additional Examinations:  · RIGHT LOWER EXTREMITY: Inspection/examination of the right lower extremity does not show any tenderness, deformity or injury. Range of motion is normal and pain-free. There is no gross instability. There are no rashes, ulcerations or lesions. Strength and tone are normal. No atrophy or abnormal movements are noted. · LEFT LOWER EXTREMITY:  Inspection/examination of the left lower extremity does not show any tenderness, deformity or injury. Range of motion is normal and pain-free. There is no gross instability. There are no rashes, ulcerations or lesions. Strength and tone are normal. No atrophy or abnormal movements are noted. Diagnostic Testing:    No new diagnostics  Results for orders placed or performed in visit on 12/11/17   Microalbumin / Creatinine Urine Ratio   Result Value Ref Range    Microalbumin, Random Urine 3.00 (H) <2.0 mg/dL    Creatinine, Ur 189.4 28.0 - 259.0 mg/dL    Microalbumin Creatinine Ratio 15.8 0.0 - 30.0 mg/g     Impression:       1. Spondylosis of lumbar region without myelopathy or radiculopathy    2. DDD (degenerative disc disease), lumbar    3. Spondylolisthesis of lumbar region    4.  HNP (herniated nucleus pulposus), lumbar        Plan:  Clinical Course: Above diagnoses are worsening    I discussed the diagnosis and the treatment options with Jenifer Dumont today. In Summary:  The various treatment options were outlined and discussed with Jenifer Dumont including:  Conservative care options: physical therapy, ice, medications, bracing, and activity modification. The indications for therapeutic injections. The indications for additional imaging/laboratory studies. The indications for (possible future) interventions. After considering the various options discussed, Jenifer Dumont elected to pursue a course of treatment that includes the followin. Medications:  I will prescribe a medrol dose pack to help with the inflammatory component of pain. Risks, benefits and alternatives were discussed. Recommended to stop any NSAIDs to reduce risk of GI bleed during the course of the dose pack. 2. PT:  Encouraged to continue with Home exercise program.    3. Further studies: No further studies. If the patient does not improve with the Medrol Dosepak we will proceed with an MRI of the lumbar spine. 4. Interventional:  No further interventions at this time. 5. Follow up:  Virtual visit on 20 with Dr. Terrence Castillo. Jenifer Dumont was instructed to call the office if her symptoms worsen or if new symptoms appear prior to the next scheduled visit. She is specifically instructed to contact the office between now & her scheduled appointment if she has concerns related to her condition or if she needs assistance in scheduling the above tests. She is welcome to call for an appointment sooner if she has any additional concerns or questions. Haleigh LUQUE ATC, am scribing for and in the presence of [facility] CALLI Blanco.  20 4:12 PM Haleigh Pereira. The physical examination was performed between the patient and [facility] CALL[facility] Blanco.   All counseling during the appointment was performed between the patient and the provider. Anabela Razo PA-C, personally performed the services described in this documentation as scribed by Haleigh Pederson ATC in my presence and it is both accurate and complete. THALIA Moreno PA-C  Board Certified by the [employer] on Certification of Physician Assistants  [address] [facility]  Partner of [facility] ([facility] and [facility])               This dictation was performed with a verbal recognition program [facility]) and it was checked for errors. It is possible that there are still dictated errors within this office note. If so, please bring any errors to my attention for an addendum. All efforts were made to ensure that this office note is accurate.

## 2020-04-13 ENCOUNTER — VIRTUAL VISIT (OUTPATIENT)
Dept: ORTHOPEDIC SURGERY | Age: 75
End: 2020-04-13
Payer: COMMERCIAL

## 2020-04-13 ENCOUNTER — TELEPHONE (OUTPATIENT)
Dept: ORTHOPEDIC SURGERY | Age: 75
End: 2020-04-13

## 2020-04-13 VITALS — WEIGHT: 214.95 LBS | HEIGHT: 60 IN | RESPIRATION RATE: 14 BRPM | BODY MASS INDEX: 42.2 KG/M2

## 2020-04-13 PROCEDURE — 99214 OFFICE O/P EST MOD 30 MIN: CPT | Performed by: PHYSICAL MEDICINE & REHABILITATION

## 2020-04-13 NOTE — PROGRESS NOTES
symptoms:   Neurogenic bowel or bladder symptoms:  no   Perceived weakness:  no   Difficulty walking:  no              Past Medical History:   Past Medical History:   Diagnosis Date    BMI 39.0-39.9,adult     BMI 40.0-44.9, adult (HCC)     Elevated blood pressure     Eustachian tube dysfunction 3/21/2014    HTN (hypertension) 2/14/2014    Hypercholesterolemia     Low bone mass 2/28/2014    Osteoarthritis, hand 12/15/2014    S/P tympanostomy tube placement 3/21/2014    Stress fracture of ankle 6/22/2016    Wheezing       Past Surgical History:     Past Surgical History:   Procedure Laterality Date    APPENDECTOMY      BLADDER SURGERY      polyps    HYSTERECTOMY, TOTAL ABDOMINAL       Current Medications:     Current Outpatient Medications:     tiZANidine (ZANAFLEX) 4 MG tablet, Take 1 tablet by mouth nightly, Disp: 30 tablet, Rfl: 0    pravastatin (PRAVACHOL) 40 MG tablet, TAKE 1 TABLET BY MOUTH DAILY, Disp: 90 tablet, Rfl: 3    metoprolol succinate (TOPROL XL) 50 MG extended release tablet, Take 1 tablet by mouth daily For high blood pressure, Disp: 90 tablet, Rfl: 2    hydrochlorothiazide (HYDRODIURIL) 25 MG tablet, 1 tab po daily for htn, Disp: 90 tablet, Rfl: 3    diclofenac sodium (VOLTAREN) 1 % GEL, Apply 4 g topically 4 times daily as needed Apply 4 gram 4 times a day, Disp: 500 g, Rfl: 2    PAZEO 0.7 % SOLN, , Disp: , Rfl:     fluticasone (FLONASE) 50 MCG/ACT nasal spray, 2 sprays each nostril daily, Disp: 1 Bottle, Rfl: 2    Cholecalciferol (VITAMIN D) 2000 UNITS CAPS capsule, Take 1 capsule by mouth daily. , Disp: , Rfl:   Allergies:  Bactrim; Prochlorperazine edisylate; Prochlorperazine maleate; Sulfamethoxazole-trimethoprim; and Amoxicillin  Social History:    reports that she has never smoked. She has never used smokeless tobacco. She reports current alcohol use. She reports that she does not use drugs.   Family History:   Family History   Problem Relation Age of Onset    Alzheimer's Disease Mother     Asthma Brother     Hypertension Sister        REVIEW OF SYSTEMS:   CONSTITUTIONAL: Denies unexplained weight loss, fevers, chills or fatigue  NEUROLOGICAL: Denies unsteady gait or progressive weakness  MUSCULOSKELETAL: Denies joint swelling or redness  GI: Denies nausea, vomiting, diarrhea   : Denies bowel or bladder issues       PHYSICAL EXAM:  Limitations present due to Telehealth  Vitals: Resp. rate 14, height 5' (1.524 m), weight 214 lb 15.2 oz (97.5 kg). GENERAL EXAM:  · General Apparence: Patient is adequately groomed with no evidence of malnutrition. · Psychiatric: Orientation: The patient is oriented to time, place and person. The patient's mood and affect are appropriate   · Vascular: Examination reveals no swelling and palpation reveals no tenderness in upper or lower extremities. Good capillary refill. · The lymphatic examination of the neck, axillae and groin reveals all areas to be without enlargement or induration   Sensation is intact without deficit in the upper and lower extremities to light touch   · Coordination of the upper and lower extremities are normal.    CERVICAL EXAMINATION:  · Inspection: Local inspection shows no step-off or bruising. Cervical alignment is normal.   · Palpation and Percussion: No evidence of tenderness at the midline. Paraspinal tenderness is not present. There is no paraspinal spasm. · Range of Motion:  pain-free ROM   · Strength: Strength testing is at least 4/5 bilateral upper extremities based on visual exam  · Skin:There are no rashes, ulcerations or lesions. · Gait & station:  normal, patient ambulates without assistance and no ataxia  · Additional Examinations:  · RIGHT UPPER EXTREMITY:  Inspection/examination of the right upper extremity does not show any tenderness, deformity or injury. Range of motion is normal and pain-free. There is no gross instability. There are no rashes, ulcerations or lesions.  Strength and tone are normal. No atrophy or abnormal movements are noted. · LEFT UPPER EXTREMITY: Inspection/examination of the left upper extremity does not show any tenderness, deformity or injury. Range of motion is normal and pain-free. There is no gross instability. There are no rashes, ulcerations or lesions. Strength and tone are normal. No atrophy or abnormal movements are noted. LUMBAR/SACRAL EXAMINATION:  · Inspection: Local inspection shows no step-off or bruising. Lumbar alignment is normal.   · Palpation:   No evidence of tenderness at the midline. Lumbar paraspinal tenderness Mild L4/5 and L5/S1 tenderness  Bursal tenderness No tenderness bilaterally  There is no paraspinal spasm. · Range of Motion: limited by 50% in all planes due to pain  · Strength:   Strength testing is at least 4/5 in all muscle groups tested based on visual exam  · Special Tests:   Straight leg raise and crossed SLR negative. · Skin: There are no rashes, ulcerations or lesions. · Gait & station: normal, patient ambulates without assistance and no ataxia  · Additional Examinations:  · RIGHT LOWER EXTREMITY: Inspection/examination of the right lower extremity does not show any tenderness, deformity or injury. Range of motion is unremarkable. There is no gross instability. There are no rashes, ulcerations or lesions. Strength and tone are normal. No atrophy or abnormal movements are noted. · LEFT LOWER EXTREMITY:  Inspection/examination of the left lower extremity does not show any tenderness, deformity or injury. Range of motion is unremarkable. There is no gross instability. There are no rashes, ulcerations or lesions. Strength and tone are normal. No atrophy or abnormal movements are noted.         Diagnostic Testing:    No new diagnostics  Results for orders placed or performed in visit on 12/11/17   Microalbumin / Creatinine Urine Ratio   Result Value Ref Range    Microalbumin, Random Urine 3.00 (H) <2.0 mg/dL    Creatinine, Ur 189.4 28.0

## 2020-04-16 ENCOUNTER — TELEPHONE (OUTPATIENT)
Dept: ORTHOPEDIC SURGERY | Age: 75
End: 2020-04-16

## 2020-04-20 ENCOUNTER — TELEPHONE (OUTPATIENT)
Dept: ORTHOPEDIC SURGERY | Age: 75
End: 2020-04-20

## 2020-04-20 ENCOUNTER — VIRTUAL VISIT (OUTPATIENT)
Dept: ORTHOPEDIC SURGERY | Age: 75
End: 2020-04-20
Payer: COMMERCIAL

## 2020-04-20 VITALS — BODY MASS INDEX: 42.2 KG/M2 | WEIGHT: 214.95 LBS | HEIGHT: 60 IN | RESPIRATION RATE: 14 BRPM

## 2020-04-20 PROCEDURE — 99214 OFFICE O/P EST MOD 30 MIN: CPT | Performed by: PHYSICAL MEDICINE & REHABILITATION

## 2020-04-20 RX ORDER — TIZANIDINE 4 MG/1
4 TABLET ORAL NIGHTLY
Qty: 30 TABLET | Refills: 0 | Status: SHIPPED | OUTPATIENT
Start: 2020-04-20 | End: 2020-05-20

## 2020-04-20 NOTE — PROGRESS NOTES
Follow up: [facility] visit (Audio/visual connection present)    Geronimo Felipe  1945  [id number]         Chief Complaint   Patient presents with    Lower Back Pain     TR MRI LSP         HISTORY OF PRESENT ILLNESS:  Ms. Honorio Burks is a 69 y.o. female returns for a follow up visit for multiple medical problems. Her current presenting problems are   1. Lumbar radiculopathy    2. Spondylosis of lumbar region without myelopathy or radiculopathy    3. Spondylolisthesis of lumbar region    4. DDD (degenerative disc disease), lumbar    . As per information/history obtained from the PADT(patient assessment and documentation tool) - She complains of pain in the lower back with radiation to the mid back, buttocks, hips Right and upper leg Right She rates the pain 4-6 and describes it as burning, throbbing, stinging. Pain is made worse by: sitting. She denies side effects from the current pain regimen. Patient reports that since the last follow up visit the physical functioning is unchanged, family/social relationships are unchanged, mood is unchanged and sleep patterns are unchanged, and that the overall functioning is worsening. Patient denies neurological bowel or bladder. Ms Honorio Burks presents in follow up of her lumbar spine MRI. She notes an increase in left sided low back pain as well without new injury. Tylenol has been helping with her symptoms as well. She still finds it hard to get comfortable at times. Patient continues to perform exercises and occasionally more than once per day. Associated signs and symptoms:   Neurogenic bowel or bladder symptoms:  no   Perceived weakness:  no   Difficulty walking:  no            Past medical, surgical, social and family history reviewed with the patient.  No pertinent relevant history  Past Medical History:   Past Medical History:   Diagnosis Date    BMI 39.0-39.9,adult     BMI 40.0-44.9, adult (HCC)     Elevated blood pressure     Eustachian tube nausea, vomiting, diarrhea   : Denies bowel or bladder issues       PHYSICAL EXAM:  Limitations present due to Telehealth  Vitals: Resp. rate 14, height 5' (1.524 m), weight 214 lb 15.2 oz (97.5 kg). GENERAL EXAM:  · General Apparence: Patient is adequately groomed with no evidence of malnutrition. · Psychiatric: Orientation: The patient is oriented to time, place and person. The patient's mood and affect are appropriate   · Vascular: Examination reveals no swelling and palpation reveals no tenderness in upper or lower extremities. Good capillary refill. · The lymphatic examination of the neck, axillae and groin reveals all areas to be without enlargement or induration   Sensation is intact without deficit in the upper and lower extremities to light touch   · Coordination of the upper and lower extremities are normal.    CERVICAL EXAMINATION:  · Inspection: Local inspection shows no step-off or bruising. Cervical alignment is normal.   · Palpation and Percussion: No evidence of tenderness at the midline. Paraspinal tenderness is not present. There is no paraspinal spasm. · Range of Motion:  limited by 25% in all planes due to pain   · Strength: Strength testing is at least 4/5 bilateral upper extremities based on visual exam  · Special Tests:   Spurling's and Glover's are negative bilaterally. Antoine and Impingement tests are negative bilaterally. · Skin:There are no rashes, ulcerations or lesions. · Gait & station:  normal, patient ambulates without assistance and no ataxia  · Additional Examinations:  · RIGHT UPPER EXTREMITY:  Inspection/examination of the right upper extremity does not show any tenderness, deformity or injury. Range of motion is normal and pain-free. There is no gross instability. There are no rashes, ulcerations or lesions. Strength and tone are normal. No atrophy or abnormal movements are noted.   · LEFT UPPER EXTREMITY: Inspection/examination of the left upper extremity does

## 2020-04-21 ENCOUNTER — TELEPHONE (OUTPATIENT)
Dept: ORTHOPEDIC SURGERY | Age: 75
End: 2020-04-21

## 2020-04-21 NOTE — TELEPHONE ENCOUNTER
DOS   05/29/2020  CPT   36863.50   DX   M54.16   M47.816  M43.16   M51.36  OP SX AUTH  NPR    BILATERAL  LEVELS   L4   PROCEDURE   TRANS FORAMINAL GILBERT  DR. West  INSURANCE:   [facility] FEDERAL      CPT  45991.19  [id number] [facility]

## 2020-04-23 ENCOUNTER — TELEPHONE (OUTPATIENT)
Dept: ORTHOPEDIC SURGERY | Age: 75
End: 2020-04-23

## 2020-04-23 RX ORDER — GABAPENTIN 100 MG/1
100 CAPSULE ORAL 3 TIMES DAILY
Qty: 90 CAPSULE | Refills: 0 | Status: SHIPPED | OUTPATIENT
Start: 2020-04-23 | End: 2020-06-17

## 2020-05-04 ENCOUNTER — VIRTUAL VISIT (OUTPATIENT)
Dept: ORTHOPEDIC SURGERY | Age: 75
End: 2020-05-04
Payer: COMMERCIAL

## 2020-05-04 VITALS — BODY MASS INDEX: 42.2 KG/M2 | WEIGHT: 214.95 LBS | HEIGHT: 60 IN | RESPIRATION RATE: 12 BRPM

## 2020-05-04 PROCEDURE — 99213 OFFICE O/P EST LOW 20 MIN: CPT | Performed by: PHYSICIAN ASSISTANT

## 2020-05-04 NOTE — PROGRESS NOTES
does not show any tenderness, deformity or injury. Range of motion is unremarkable and pain-free. There is no gross instability. There are no rashes, ulcerations or lesions. Strength and tone are normal. No atrophy or abnormal movements are noted. LUMBAR/SACRAL EXAMINATION:  · Inspection: Local inspection shows no step-off or bruising. Lumbar alignment is normal. No instability is noted. · Palpation:   No evidence of tenderness at the midline. Lumbar paraspinal tenderness: Mild L4/5 and L5/S1 tenderness right greater than left. Bursal tenderness No tenderness bilaterally  There is no paraspinal spasm. · Range of Motion: limited by 25% in all planes due to pain  · Strength:   Strength testing is 5/5 in all muscle groups tested with visual exam.  · Special Tests:   Straight leg raise positive right greater than left and crossed SLR negative. · Skin: There are no rashes, ulcerations or lesions. · Gait & station: normal, patient ambulates without assistance and no ataxia  · Additional Examinations:  · RIGHT LOWER EXTREMITY: Inspection/examination of the right lower extremity does not show any tenderness, deformity or injury. Range of motion is normal and pain-free. There is no gross instability. There are no rashes, ulcerations or lesions. Strength and tone are normal. No atrophy or abnormal movements are noted. · LEFT LOWER EXTREMITY:  Inspection/examination of the left lower extremity does not show any tenderness, deformity or injury. Range of motion is normal and pain-free. There is no gross instability. There are no rashes, ulcerations or lesions. Strength and tone are normal. No atrophy or abnormal movements are noted. Diagnostic Testing:    MR Lumbar spine shows from 4/17/20:     FINDINGS:  Assume five lumbar vertebrae.  Moderate lateral curve, convex left, of lumbar spine.     Lordosis is accentuated.  Conus medullaris termination is normal.  Lower thoracic and lumbar    discs are dehydrated.

## 2020-05-11 ENCOUNTER — TELEPHONE (OUTPATIENT)
Dept: ORTHOPEDIC SURGERY | Age: 75
End: 2020-05-11

## 2020-05-12 ENCOUNTER — TELEPHONE (OUTPATIENT)
Dept: ORTHOPEDIC SURGERY | Age: 75
End: 2020-05-12

## 2020-05-26 ENCOUNTER — OFFICE VISIT (OUTPATIENT)
Dept: PRIMARY CARE CLINIC | Age: 75
End: 2020-05-26

## 2020-05-28 LAB
SARS-COV-2: NOT DETECTED
SOURCE: NORMAL

## 2020-05-28 NOTE — PROGRESS NOTES
Patient has no family to drive her home, insurance provider arranged transportation. Patient is fine with not having IV sedation so she can sign for herself.

## 2020-06-02 ENCOUNTER — HOSPITAL ENCOUNTER (OUTPATIENT)
Age: 75
Setting detail: OUTPATIENT SURGERY
Discharge: HOME | End: 2020-06-02
Attending: PHYSICAL MEDICINE & REHABILITATION | Admitting: PHYSICAL MEDICINE & REHABILITATION
Payer: COMMERCIAL

## 2020-06-02 VITALS
SYSTOLIC BLOOD PRESSURE: 150 MMHG | HEIGHT: 60 IN | OXYGEN SATURATION: 99 % | TEMPERATURE: 98.5 F | RESPIRATION RATE: 16 BRPM | WEIGHT: 214 LBS | DIASTOLIC BLOOD PRESSURE: 84 MMHG | BODY MASS INDEX: 42.01 KG/M2 | HEART RATE: 68 BPM

## 2020-06-02 PROCEDURE — 2500000003 HC RX 250 WO HCPCS: Performed by: PHYSICAL MEDICINE & REHABILITATION

## 2020-06-02 PROCEDURE — 7100000010 HC PHASE II RECOVERY - FIRST 15 MIN: Performed by: PHYSICAL MEDICINE & REHABILITATION

## 2020-06-02 PROCEDURE — 3600000002 HC SURGERY LEVEL 2 BASE: Performed by: PHYSICAL MEDICINE & REHABILITATION

## 2020-06-02 PROCEDURE — 6360000002 HC RX W HCPCS: Performed by: PHYSICAL MEDICINE & REHABILITATION

## 2020-06-02 PROCEDURE — 6360000004 HC RX CONTRAST MEDICATION: Performed by: PHYSICAL MEDICINE & REHABILITATION

## 2020-06-02 PROCEDURE — 2709999900 HC NON-CHARGEABLE SUPPLY: Performed by: PHYSICAL MEDICINE & REHABILITATION

## 2020-06-02 RX ORDER — LIDOCAINE HYDROCHLORIDE 10 MG/ML
INJECTION, SOLUTION EPIDURAL; INFILTRATION; INTRACAUDAL; PERINEURAL PRN
Status: DISCONTINUED | OUTPATIENT
Start: 2020-06-02 | End: 2020-06-02 | Stop reason: ALTCHOICE

## 2020-06-02 RX ORDER — BUPIVACAINE HYDROCHLORIDE 5 MG/ML
INJECTION, SOLUTION EPIDURAL; INTRACAUDAL
Status: DISCONTINUED
Start: 2020-06-02 | End: 2020-06-02 | Stop reason: HOSPADM

## 2020-06-02 RX ORDER — ACETAMINOPHEN 500 MG
500 TABLET ORAL EVERY 6 HOURS PRN
COMMUNITY

## 2020-06-02 RX ORDER — BETAMETHASONE SODIUM PHOSPHATE AND BETAMETHASONE ACETATE 3; 3 MG/ML; MG/ML
INJECTION, SUSPENSION INTRA-ARTICULAR; INTRALESIONAL; INTRAMUSCULAR; SOFT TISSUE
Status: DISCONTINUED
Start: 2020-06-02 | End: 2020-06-02 | Stop reason: HOSPADM

## 2020-06-02 ASSESSMENT — PAIN SCALES - GENERAL: PAINLEVEL_OUTOF10: 0

## 2020-06-02 ASSESSMENT — PAIN DESCRIPTION - DESCRIPTORS: DESCRIPTORS: BURNING;SORE;CONSTANT

## 2020-06-02 ASSESSMENT — PAIN - FUNCTIONAL ASSESSMENT
PAIN_FUNCTIONAL_ASSESSMENT: PREVENTS OR INTERFERES SOME ACTIVE ACTIVITIES AND ADLS
PAIN_FUNCTIONAL_ASSESSMENT: 0-10

## 2020-06-02 NOTE — H&P
HISTORY AND PHYSICAL/PRE-SEDATION ASSESSMENT    Patient:  Milagros Pressley   :  1945  Medical Record No.:  [id number]   Date:  2020  Physician:  Anahi Balderrama M.D. Facility: [facility]    HISTORY OF PRESENT ILLNESS:                 The patient is a 69 y.o. female whom presents with lower back and leg pain. Review of the imaging and physical exam of the patient confirmed the pre-procedure diagnosis. After a thorough discussion of risks, benefits and alternatives informed consent was obtained. Past Medical History:   Past Medical History:   Diagnosis Date    BMI 39.0-39.9,adult     BMI 40.0-44.9, adult (HCC)     Elevated blood pressure     Eustachian tube dysfunction 3/21/2014    HTN (hypertension) 2014    Hypercholesterolemia     Low bone mass 2014    Osteoarthritis, hand 12/15/2014    S/P tympanostomy tube placement 3/21/2014    Stress fracture of ankle 2016    Wheezing       Past Surgical History:     Past Surgical History:   Procedure Laterality Date    APPENDECTOMY      BLADDER SURGERY      polyps    HYSTERECTOMY, TOTAL ABDOMINAL       Current Medications:   Prior to Admission medications    Medication Sig Start Date End Date Taking?  Authorizing Provider   acetaminophen (TYLENOL) 500 MG tablet Take 500 mg by mouth every 6 hours as needed for Pain   Yes Historical Provider, MD   pravastatin (PRAVACHOL) 40 MG tablet TAKE 1 TABLET BY MOUTH DAILY 18  Yes Samina Hendricks MD   metoprolol succinate (TOPROL XL) 50 MG extended release tablet Take 1 tablet by mouth daily For high blood pressure 18  Yes Samina Hendricks MD   hydrochlorothiazide (HYDRODIURIL) 25 MG tablet 1 tab po daily for htn 18  Yes Samina Hendricks MD   fluticasone [facility]) 50 MCG/ACT nasal spray 2 sprays each nostril daily 12/15/14  Yes Samina Hendricks MD   Cholecalciferol (VITAMIN D) 2000 UNITS CAPS capsule Take 1 capsule by mouth daily. Yes Historical Provider, MD   gabapentin (NEURONTIN) 100 MG capsule Take 1 capsule by mouth 3 times daily for 30 days. Intended supply: 90 days 4/23/20 5/23/20  CALLI Velasco   tiZANidine (ZANAFLEX) 4 MG tablet Take 1 tablet by mouth nightly 4/24/19   CALLI Velasco   PAZEO 0.7 % SOLN  3/22/16   Historical Provider, MD     Allergies:  Bactrim; Prochlorperazine edisylate; Prochlorperazine maleate; Sulfamethoxazole-trimethoprim; and Amoxicillin  Social History:    reports that she has never smoked. She has never used smokeless tobacco. She reports current alcohol use. She reports that she does not use drugs. Family History:   Family History   Problem Relation Age of Onset   Mookie Miners Alzheimer's Disease Mother     Asthma Brother     Hypertension Sister        Vitals: Blood pressure (!) 164/96, pulse 80, temperature 98.5 °F (36.9 °C), resp. rate (!) 0, height 5' (1.524 m), weight 214 lb (97.1 kg), SpO2 98 %. PHYSICAL EXAM:  HENT: Airway patent and reviewed  Cardiovascular: Normal rate, regular rhythm, normal heart sounds. Pulmonary/Chest: No wheezes. No rhonchi. No rales. Abdominal: Soft. Bowel sounds are normal. No distension. Extremities: Moves all extremities equally  Lumbar Spine: Painful range of motion, no midline tenderness       Diagnosis:Lumbar radiculopathy    Plan: Proceed with planned procedure    The patient was counseled at length about the risks of steven Covid-19 in the katie-operative and post-operative states including the recovery window of their procedure. The patient was made aware that steven Covid-19 after a surgical procedure may worsen their prognosis for recovering from the virus and lend to a higher morbidity and or mortality risk. The patient was given the options of postponing their procedure. All of the risks, benefits, and alternatives were discussed. The patient does wish to proceed with the procedure. ASA CLASS:         []   I.  Normal,

## 2020-06-17 ENCOUNTER — OFFICE VISIT (OUTPATIENT)
Dept: ORTHOPEDIC SURGERY | Age: 75
End: 2020-06-17
Payer: COMMERCIAL

## 2020-06-17 VITALS
BODY MASS INDEX: 42.03 KG/M2 | HEIGHT: 60 IN | DIASTOLIC BLOOD PRESSURE: 79 MMHG | WEIGHT: 214.07 LBS | SYSTOLIC BLOOD PRESSURE: 135 MMHG

## 2020-06-17 PROCEDURE — 99213 OFFICE O/P EST LOW 20 MIN: CPT | Performed by: PHYSICIAN ASSISTANT

## 2020-06-17 NOTE — PROGRESS NOTES
Follow up: Shannan Rosales  1945  [id number]         Chief Complaint   Patient presents with    Lower Back Pain     F/u Right L4 & Left L4 TF 6/2         HISTORY OF PRESENT ILLNESS:  Ms. Yemi Mckinley is a 69 y.o. female returns for a follow up visit for multiple medical problems. Her current presenting problems are   1. Lumbar radiculopathy    2. Spondylosis of lumbar region without myelopathy or radiculopathy    3. Spondylolisthesis of lumbar region    4. DDD (degenerative disc disease), lumbar    5. HNP (herniated nucleus pulposus), lumbar    . As per information/history obtained from the PADT(patient assessment and documentation tool) - She complains of pain in the lower back. She rates the pain 3/10 and describes it as dull, aching. Pain is made worse by: twisting, driving. She reports side effects from the current pain regimen: facial flushing, redness, nervousness energy. Patient reports that since the last follow up visit the physical functioning is better, family/social relationships are better, mood is better and sleep patterns are better, and that the overall functioning is better. Patient denies neurological bowel or bladder. Ms. Yemi Mckinley presents today for follow up of her ongoing low back pain. She was previously having leg pain but denies any radiating lower extremity pain at this time. She recently underwent a right L4 and left L4 transforaminal epidural steroid injection on 6/2/20. She notes having improvement since her injection, reporting her pain has dropped from a 5/10 to a 3/10. She reports not being able to use the zanaflex due to severe side effects but also does not feel as though she needs this medication at this time. She is now able to control her pain with tylenol once daily, even reporting some days she does not need to use tylenol. She denies any new injuries or triggers to her back since her procedure.   She does report experiencing facial flushing and redness tablet, Rfl: 3    PAZEO 0.7 % SOLN, , Disp: , Rfl:     fluticasone (FLONASE) 50 MCG/ACT nasal spray, 2 sprays each nostril daily, Disp: 1 Bottle, Rfl: 2    Cholecalciferol (VITAMIN D) 2000 UNITS CAPS capsule, Take 1 capsule by mouth daily. , Disp: , Rfl:   Allergies:  Bactrim; Prochlorperazine edisylate; Prochlorperazine maleate; Sulfamethoxazole-trimethoprim; and Amoxicillin  Social History:    reports that she has never smoked. She has never used smokeless tobacco. She reports current alcohol use. She reports that she does not use drugs. Family History:   Family History   Problem Relation Age of Onset   Sander Coad Alzheimer's Disease Mother     Asthma Brother     Hypertension Sister        REVIEW OF SYSTEMS:   CONSTITUTIONAL: Denies unexplained weight loss, fevers, chills or fatigue  NEUROLOGICAL: Denies unsteady gait or progressive weakness  MUSCULOSKELETAL: Denies joint swelling or redness  GI: Denies nausea, vomiting, diarrhea   : Denies bowel or bladder issues       PHYSICAL EXAM:    Vitals: Blood pressure 135/79, height 5' (1.524 m), weight 214 lb 1.1 oz (97.1 kg). GENERAL EXAM:  · General Apparence: Patient is adequately groomed with no evidence of malnutrition. · Psychiatric: Orientation: The patient is oriented to time, place and person. The patient's mood and affect are appropriate   · Vascular: Examination reveals no swelling and palpation reveals no tenderness in upper or lower extremities. Good capillary refill. · The lymphatic examination of the neck, axillae and groin reveals all areas to be without enlargement or induration  · Sensation is intact without deficit in the upper and lower extremities to light touch and pinprick  · Coordination of the upper and lower extremities are normal.  · RIGHT UPPER EXTREMITY:  Inspection/examination of the right upper extremity does not show any tenderness, deformity or injury. Range of motion is unremarkable and pain-free. There is no gross instability. for therapeutic injections. The indications for additional imaging/laboratory studies. The indications for (possible future) interventions. After considering the various options discussed, Eve Muñiz elected to pursue a course of treatment that includes the followin. Medications:  No further recommendations for new medications. The patient will continue to take Tylenol as needed. 2. PT:  Encouraged to continue with Home exercise program.  The patient will continue to do the home exercise program pain special attention not to do the exercises that increase her pain. 3. Further studies: No further studies. 4. Interventional:  The patient is 50-60% relieved at this time after the lumbar GILBERT. 5. Follow up:  8 weeks. Eve Muñiz was instructed to call the office if her symptoms worsen or if new symptoms appear prior to the next scheduled visit. She is specifically instructed to contact the office between now & her scheduled appointment if she has concerns related to her condition or if she needs assistance in scheduling the above tests. She is welcome to call for an appointment sooner if she has any additional concerns or questions. Haleigh LUQUE am scribing for CALLI Tinoco.  20 4:21 PM Haleigh Miranda. The physical examination was performed between the patient and LEA Tinoco All counseling during the appointment was performed between the patient and the provider. Federico Razo PA-C, personally performed the services described in this documentation as scribed by Haleigh Pederson ATC in my presence and it is both accurate and complete.             THALIA Chavez PA-C  Board Certified by the [employer] on Certification of Physician Assistants  Mario Reeder 60  Partner of [facility] ([facility])         This dictation was performed with a verbal recognition program (DRAGON) and it was checked for errors. It is possible that there are still dictated errors within this office note. If so, please bring any errors to my attention for an addendum. All efforts were made to ensure that this office note is accurate.

## 2020-08-12 ENCOUNTER — OFFICE VISIT (OUTPATIENT)
Dept: ORTHOPEDIC SURGERY | Age: 75
End: 2020-08-12
Payer: COMMERCIAL

## 2020-08-12 VITALS — WEIGHT: 214.07 LBS | TEMPERATURE: 98.9 F | HEIGHT: 60 IN | BODY MASS INDEX: 42.03 KG/M2 | RESPIRATION RATE: 14 BRPM

## 2020-08-12 PROCEDURE — 99213 OFFICE O/P EST LOW 20 MIN: CPT | Performed by: STUDENT IN AN ORGANIZED HEALTH CARE EDUCATION/TRAINING PROGRAM

## 2020-08-12 NOTE — PROGRESS NOTES
Follow up: [address]  1945  [id number]      CHIEF COMPLAINT:    Chief Complaint   Patient presents with    Lower Back Pain     F/U LSP          HISTORY OF PRESENT ILLNESS:  Ms. Vince Loo is a 69 y.o. female returns for a follow up visit for multiple medical problems. Her current presenting problems are   1. Lumbar radiculopathy    2. Spondylosis of lumbar region without myelopathy or radiculopathy    3. Spondylolisthesis of lumbar region    4. DDD (degenerative disc disease), lumbar    5. HNP (herniated nucleus pulposus), lumbar    . As per information/history obtained from the PADT(patient assessment and documentation tool) - She complains of pain in the lower back with radiation to the buttocks She rates the pain 2/10 and describes it as dull. Pain is made worse by: sitting, sleeping in a bad position. She denies side effects from the current pain regimen. Patient reports that since the last follow up visit the physical functioning is better, family/social relationships are better, mood is better and sleep patterns are better, and that the overall functioning is better. Patient denies neurological bowel or bladder. The patient presents for follow up of her low back and right leg pain. She underwent a transforaminal epidural steroid injection on 6/2/2020 and feels that she has continued to improve even more since her last visit following the injection. On July 12th the patient reports she did feel some pain go down her right leg, however two weeks later on July 28th she had no symptoms at all. She does continue to feel some low back soreness especially if she has a bad night of sleep or if the weather is changing. The patient states her physical therapy exercises help, although some of them trigger her pain. Tylenol helps her pain considerably. She no longer needs or uses the muscle relaxer.   The patient feels her pain is tolerable and is not interested in any interventions at this time.         Associated signs and symptoms:   Neurogenic bowel or bladder symptoms:  no   Perceived weakness:  no   Difficulty walking:  no              Past Medical History:   Past Medical History:   Diagnosis Date    BMI 39.0-39.9,adult     BMI 40.0-44.9, adult (HCC)     Elevated blood pressure     Eustachian tube dysfunction 3/21/2014    HTN (hypertension) 2/14/2014    Hypercholesterolemia     Low bone mass 2/28/2014    Osteoarthritis, hand 12/15/2014    S/P tympanostomy tube placement 3/21/2014    Stress fracture of ankle 6/22/2016    Wheezing       Past Surgical History:     Past Surgical History:   Procedure Laterality Date    APPENDECTOMY      BLADDER SURGERY      polyps    HYSTERECTOMY, TOTAL ABDOMINAL      PAIN MANAGEMENT PROCEDURE Bilateral 6/2/2020    BILATERAL LUMBAR FOUR TRANSFORMINAL EPIDURAL STEROID INJECTION SITE CONFIRMED BY FLUOROSCOPY performed by Gabby Gupta MD at [place] [id number]     Current Medications:     Current Outpatient Medications:     acetaminophen (TYLENOL) 500 MG tablet, Take 500 mg by mouth every 6 hours as needed for Pain, Disp: , Rfl:     tiZANidine (ZANAFLEX) 4 MG tablet, Take 1 tablet by mouth nightly, Disp: 30 tablet, Rfl: 0    pravastatin (PRAVACHOL) 40 MG tablet, TAKE 1 TABLET BY MOUTH DAILY, Disp: 90 tablet, Rfl: 3    metoprolol succinate (TOPROL XL) 50 MG extended release tablet, Take 1 tablet by mouth daily For high blood pressure, Disp: 90 tablet, Rfl: 2    hydrochlorothiazide (HYDRODIURIL) 25 MG tablet, 1 tab po daily for htn, Disp: 90 tablet, Rfl: 3    PAZEO 0.7 % SOLN, , Disp: , Rfl:     fluticasone (FLONASE) 50 MCG/ACT nasal spray, 2 sprays each nostril daily, Disp: 1 Bottle, Rfl: 2    Cholecalciferol (VITAMIN D) 2000 UNITS CAPS capsule, Take 1 capsule by mouth daily. , Disp: , Rfl:   Allergies:  Bactrim; Prochlorperazine edisylate; Prochlorperazine maleate; Sulfamethoxazole-trimethoprim; and Amoxicillin  Social History:    reports that she has never smoked. She has never used smokeless tobacco. She reports current alcohol use. She reports that she does not use drugs. Family History:   Family History   Problem Relation Age of Onset   Conchis Rascon Alzheimer's Disease Mother     Asthma Brother     Hypertension Sister        REVIEW OF SYSTEMS:   CONSTITUTIONAL: Denies unexplained weight loss, fevers, chills or fatigue  NEUROLOGICAL: Denies unsteady gait or progressive weakness  MUSCULOSKELETAL: Denies joint swelling or redness  GI: Denies nausea, vomiting, diarrhea   : Denies bowel or bladder issues       PHYSICAL EXAM:    Vitals: Temperature 98.9 °F (37.2 °C), resp. rate 14, height 5' (1.524 m), weight 214 lb 1.1 oz (97.1 kg). GENERAL EXAM:  · General Apparence: Patient is adequately groomed with no evidence of malnutrition. · Psychiatric: Orientation: The patient is oriented to time, place and person. The patient's mood and affect are appropriate   · Vascular: Examination reveals no swelling and palpation reveals no tenderness in upper or lower extremities. Good capillary refill. · The lymphatic examination of the neck, axillae and groin reveals all areas to be without enlargement or induration  · Sensation is intact without deficit in the upper and lower extremities to light touch and pinprick  · Coordination of the upper and lower extremities are normal.  · RIGHT UPPER EXTREMITY:  Inspection/examination of the right upper extremity does not show any tenderness, deformity or injury. Range of motion is unremarkable and pain-free. There is no gross instability. There are no rashes, ulcerations or lesions. Strength and tone are normal. No atrophy or abnormal movements are noted. · LEFT UPPER EXTREMITY: Inspection/examination of the left upper extremity does not show any tenderness, deformity or injury. Range of motion is unremarkable and pain-free. There is no gross instability. There are no rashes, ulcerations or lesions.  Strength and tone are normal. No atrophy or abnormal movements are noted. LUMBAR/SACRAL EXAMINATION:  · Inspection: Local inspection shows no step-off or bruising. Lumbar alignment is normal. No instability is noted. · Palpation:   No evidence of tenderness at the midline. Lumbar paraspinal tenderness: Mild L4/5 and L5/S1 tenderness  Bursal tenderness No tenderness bilaterally  There is no paraspinal spasm. · Range of Motion: pain-free ROM  · Strength:   Strength testing is 5/5 in all muscle groups tested. · Special Tests:   Straight leg raise and crossed SLR negative. Alfredito's testing is negative bilaterally. FADIR's testing is negative bilaterally. · Skin: There are no rashes, ulcerations or lesions. · Reflexes: Reflexes are symmetrically 2+ at the patellar and ankle tendons. Clonus absent bilaterally at the feet. · Gait & station: normal, patient ambulates without assistance  · Additional Examinations:  · RIGHT LOWER EXTREMITY: Inspection/examination of the right lower extremity does not show any tenderness, deformity or injury. Range of motion is normal and pain-free. There is no gross instability. There are no rashes, ulcerations or lesions. Strength and tone are normal. No atrophy or abnormal movements are noted. · LEFT LOWER EXTREMITY:  Inspection/examination of the left lower extremity does not show any tenderness, deformity or injury. Range of motion is normal and pain-free. There is no gross instability. There are no rashes, ulcerations or lesions. Strength and tone are normal. No atrophy or abnormal movements are noted.       Diagnostic Testing:    MR Lumbar spine shows  4/17/2020  L1-2: 2mm retrolisthesis.  Shallow right lateral disc protrusion gently encroaches upon right    ventral dural sac; contacts right L2 nerve root.  Moderate facet hypertrophy.  Borderline small     spinal canal.  Mild right lateral recess narrowing.  Mild biforaminal narrowing.         L2-3: Disc height reduction; vacuum phenomenon; 2mm  Mild right lateral    recess stenosis. 3. L4-5 moderate spinal stenosis.  Disc bulge.  Posterior element hypertrophy. 4. L5-S1 trace retrolisthesis; shallow left lateral/inferior foraminal protrusion; disc    contacts left S1 nerve root.  Mild spinal stenosis.  Moderate left lateral recess stenosis.      Moderate left foraminal stenosis. 5. L2-3 mild spinal stenosis.  Central and lateral protrusion with left and right components;    slightly encroaching upon left ventral dural sac and left L3 nerve root; right component with    slight inferior migration subtly encroaching upon right pre foraminal dural sac and contacting    right L3 nerve root.             Results for orders placed or performed in visit on 20   Covid-19 Ambulatory    Specimen: Nasopharynx/Oropharynx   Result Value Ref Range    SARS-CoV-2 Not Detected Not Detected    Source NP swab      Impression:       1. Lumbar radiculopathy    2. Spondylosis of lumbar region without myelopathy or radiculopathy    3. Spondylolisthesis of lumbar region    4. DDD (degenerative disc disease), lumbar    5. HNP (herniated nucleus pulposus), lumbar        Plan:  Clinical Course: Above diagnoses are improving     I discussed the diagnosis and the treatment options with Dock Contes today. In Summary:  The various treatment options were outlined and discussed with Melyssa Griffin including:  Conservative care options: physical therapy, ice, medications, bracing, and activity modification. The indications for therapeutic injections. The indications for additional imaging/laboratory studies. The indications for (possible future) interventions. After considering the various options discussed, Melyssa Silvas elected to pursue a course of treatment that includes the followin. Medications:  No further recommendations for new medications. 2. PT:  Encouraged to continue with HEP. 3. Further studies:  No further studies.     4. Interventional:  Patient continues to feel ~60% relief following epidural steroid injection in June. 5. Follow up:  4-6 months      Roselyn Garcia was instructed to call the office if her symptoms worsen or if new symptoms appear prior to the next scheduled visit. She is specifically instructed to contact the office between now & her scheduled appointment if she has concerns related to her condition or if she needs assistance in scheduling the above tests. She is welcome to call for an appointment sooner if she has any additional concerns or questions. Ana Valenzuela PA-C   Board Certified by the [employer] on Certification of Physician Assistants  [id number]  Partner of [facility])             This dictation was performed with a verbal recognition program [facility]) and it was checked for errors. It is possible that there are still dictated errors within this office note. If so, please bring any errors to my attention for an addendum. All efforts were made to ensure that this office note is accurate.

## 2024-03-11 ENCOUNTER — OFFICE VISIT (OUTPATIENT)
Dept: URGENT CARE | Age: 79
End: 2024-03-11

## 2024-03-11 VITALS
DIASTOLIC BLOOD PRESSURE: 80 MMHG | HEART RATE: 102 BPM | TEMPERATURE: 98.8 F | OXYGEN SATURATION: 95 % | WEIGHT: 184 LBS | SYSTOLIC BLOOD PRESSURE: 153 MMHG | BODY MASS INDEX: 35.94 KG/M2

## 2024-03-11 DIAGNOSIS — Z20.822 CONTACT WITH AND (SUSPECTED) EXPOSURE TO COVID-19: ICD-10-CM

## 2024-03-11 DIAGNOSIS — R05.1 ACUTE COUGH: Primary | ICD-10-CM

## 2024-03-11 DIAGNOSIS — I10 UNCONTROLLED HYPERTENSION: ICD-10-CM

## 2024-03-11 LAB
Lab: NORMAL
QC PASS/FAIL: NORMAL
SARS-COV-2 RDRP RESP QL NAA+PROBE: NEGATIVE

## 2024-03-11 RX ORDER — GABAPENTIN 100 MG/1
CAPSULE ORAL
COMMUNITY
Start: 2023-12-12

## 2024-03-11 NOTE — PROGRESS NOTES
sounds.   Pulmonary:      Effort: Pulmonary effort is normal.      Breath sounds: Normal breath sounds.   Musculoskeletal:      Cervical back: Normal range of motion and neck supple.   Skin:     General: Skin is warm and dry.   Neurological:      Mental Status: She is alert and oriented to person, place, and time.   Psychiatric:         Attention and Perception: Attention normal.         Behavior: Behavior is cooperative.         PROCEDURES:  Unless otherwise noted below, none     Procedures    RESULTS:  SARS-COV-2, RdRp gene (no units)   Date Value   03/11/2024 Negative     An electronic signature was used to authenticate this note.    --Arslan Villafana PA-C

## 2024-03-11 NOTE — PATIENT INSTRUCTIONS
In clinic COVID test: Negative.  Recommend OTC treatment for symptoms:  acetaminophen (Tylenol) for fevers and pain relief.  antihistamines (Claritin, Zyrtec, Allegra, or Xyzal) and nasal steroid sprays (such as Flonase) to help with nasal congestion and runny nose.  guaifenesin (Mucinex) can help with thinning out mucus which can help with chest congestion or with relieving persistent sinus pressure  throat sprays (Cepacol, chloraseptic) for throat pain.  dextromethorphan (Robitussin, Delsym), throat lozenges, or honey (1-2 teaspoons every hour) for cough.  warm teas, humidifiers, nasal lavages, and sleeping in an inclined position are also helpful options that can lessen symptoms.    Continue at-home monitoring of BP and follow up with PCP should elevated BP readings persist at home.  Recommend discussing your BP situation with your PCP at your earliest convenience.  If headaches, chest pain, shortness of breath, back pain, abdominal pain, weakness, numbness, or worsened concerns develop, follow up with the ER for further evaluation.

## 2024-03-25 ENCOUNTER — HOSPITAL ENCOUNTER (EMERGENCY)
Age: 79
Discharge: HOME | End: 2024-03-25
Attending: EMERGENCY MEDICINE
Payer: COMMERCIAL

## 2024-03-25 VITALS
TEMPERATURE: 97.6 F | OXYGEN SATURATION: 97 % | SYSTOLIC BLOOD PRESSURE: 161 MMHG | BODY MASS INDEX: 35.14 KG/M2 | RESPIRATION RATE: 16 BRPM | DIASTOLIC BLOOD PRESSURE: 75 MMHG | WEIGHT: 179 LBS | HEIGHT: 60 IN | HEART RATE: 80 BPM

## 2024-03-25 DIAGNOSIS — N94.9 VAGINAL DISCOMFORT: Primary | ICD-10-CM

## 2024-03-25 PROCEDURE — 99282 EMERGENCY DEPT VISIT SF MDM: CPT

## 2024-03-25 RX ORDER — ESTRADIOL 0.1 MG/G
CREAM VAGINAL
COMMUNITY
Start: 2024-03-25

## 2024-03-25 NOTE — DISCHARGE INSTRUCTIONS
There is not an applicator that I can see or feel in your vagina. Follow up with your gynecologist. It is possible you have a small scratch or irritation of the vaginal wall that is causing your discomfort.

## 2024-03-25 NOTE — ED PROVIDER NOTES
THE [facility]  EMERGENCY DEPARTMENT ENCOUNTER          ATTENDING PHYSICIAN NOTE       Date of evaluation: 3/25/2024    Chief Complaint     OTHER (Pt coming from home for estradiol applicator stuck in vagina. Denies pain but states that she can feel it. )      History of Present Illness     Johanne Odom is a 78 y.o. female who presents with a chief complaint of other.  Patient thinks that she may have accidentally gotten her estradiol applicator stuck in her vagina last night.  States that she was placing this medicine and thinks she fell asleep shortly after, woke up with the lights on and is now having some vaginal discomfort, feels like the applicator is stuck.  Denies any urinary symptoms.  Felt fine before bed last night.  Called her gynecologist who instructed her to come to the ER for further evaluation.    ASSESSMENT / PLAN  (MEDICAL DECISION MAKING)     INITIAL VITALS: BP: (!) 161/75, Temp: 97.6 °F (36.4 °C), Pulse: 80, Respirations: 16, SpO2: 97 %      Johanne Odom is a 78 y.o. female who presents with a chief complaint of vaginal discomfort.  Initial exam reveals a well-appearing female in no acute distress with hypertension but otherwise normal vitals, afebrile.  Physical exam remarkable for benign abdomen, normal vaginal exam without any obvious foreign body, no obvious significant scratch or irritation.  This exam included both a speculum and bimanual exam patient tolerated this exam well.  Provided with reassurance.  Patient on reassessment after pelvic exam stated she felt slightly better, and agrees that maybe she has a small scratch causing discomfort and that the lubrication used on the pelvic exam helped relieve this discomfort.  I did touch base with patient's gynecologist who can get her in for a follow-up appointment today or tomorrow.  Patient will receive a phone call in the next 20 or 30 minutes to touch base about this.  Patient discharged home in good condition.    Is this

## (undated) DEVICE — GAUZE,SPONGE,4"X4",16PLY,STRL,LF,10/TRAY: Brand: MEDLINE

## (undated) DEVICE — CHLORAPREP 26ML ORANGE

## (undated) DEVICE — STERILE POLYISOPRENE POWDER-FREE SURGICAL GLOVES: Brand: PROTEXIS

## (undated) DEVICE — UNIVERSAL BLOCK TRAY: Brand: MEDLINE INDUSTRIES, INC.

## (undated) DEVICE — ALCOHOL RUBBING 16OZ 70% ISO

## (undated) DEVICE — TOWEL,OR,DSP,ST,BLUE,STD,4/PK,20PK/CS: Brand: MEDLINE